# Patient Record
Sex: MALE | Race: WHITE | NOT HISPANIC OR LATINO | Employment: FULL TIME | URBAN - NONMETROPOLITAN AREA
[De-identification: names, ages, dates, MRNs, and addresses within clinical notes are randomized per-mention and may not be internally consistent; named-entity substitution may affect disease eponyms.]

---

## 2017-02-17 ENCOUNTER — APPOINTMENT (OUTPATIENT)
Dept: GENERAL RADIOLOGY | Facility: HOSPITAL | Age: 62
End: 2017-02-17

## 2017-02-17 ENCOUNTER — HOSPITAL ENCOUNTER (OUTPATIENT)
Facility: HOSPITAL | Age: 62
Setting detail: OBSERVATION
Discharge: HOME | End: 2017-02-18
Attending: FAMILY MEDICINE | Admitting: HOSPITALIST

## 2017-02-17 DIAGNOSIS — R07.9 CHEST PAIN, UNSPECIFIED TYPE: Primary | ICD-10-CM

## 2017-02-17 LAB
ALBUMIN SERPL-MCNC: 4.3 G/DL (ref 3.4–4.8)
ALBUMIN/GLOB SERPL: 1.3 G/DL (ref 1.1–1.8)
ALP SERPL-CCNC: 97 U/L (ref 38–126)
ALT SERPL W P-5'-P-CCNC: 37 U/L (ref 21–72)
AMYLASE SERPL-CCNC: 70 U/L (ref 50–130)
ANION GAP SERPL CALCULATED.3IONS-SCNC: 11 MMOL/L (ref 5–15)
ARTICHOKE IGE QN: 87 MG/DL (ref 1–129)
AST SERPL-CCNC: 28 U/L (ref 17–59)
BASOPHILS # BLD AUTO: 0.03 10*3/MM3 (ref 0–0.2)
BASOPHILS NFR BLD AUTO: 0.5 % (ref 0–2)
BILIRUB SERPL-MCNC: 0.9 MG/DL (ref 0.2–1.3)
BUN BLD-MCNC: 19 MG/DL (ref 7–21)
BUN/CREAT SERPL: 19.4 (ref 7–25)
CALCIUM SPEC-SCNC: 8.9 MG/DL (ref 8.4–10.2)
CHLORIDE SERPL-SCNC: 103 MMOL/L (ref 95–110)
CHOLEST SERPL-MCNC: 170 MG/DL (ref 0–199)
CO2 SERPL-SCNC: 25 MMOL/L (ref 22–31)
CREAT BLD-MCNC: 0.98 MG/DL (ref 0.7–1.3)
D-DIMER, QUANTITATIVE (MAD,POW, STR): <270 NG/ML (FEU) (ref 0–470)
DEPRECATED RDW RBC AUTO: 41.2 FL (ref 35.1–43.9)
EOSINOPHIL # BLD AUTO: 0.32 10*3/MM3 (ref 0–0.7)
EOSINOPHIL NFR BLD AUTO: 5.1 % (ref 0–7)
ERYTHROCYTE [DISTWIDTH] IN BLOOD BY AUTOMATED COUNT: 12.6 % (ref 11.5–14.5)
GFR SERPL CREATININE-BSD FRML MDRD: 77 ML/MIN/1.73 (ref 49–113)
GLOBULIN UR ELPH-MCNC: 3.3 GM/DL (ref 2.3–3.5)
GLUCOSE BLD-MCNC: 91 MG/DL (ref 60–100)
HCT VFR BLD AUTO: 42.1 % (ref 39–49)
HDLC SERPL-MCNC: 26 MG/DL (ref 60–200)
HGB BLD-MCNC: 14.8 G/DL (ref 13.7–17.3)
HOLD SPECIMEN: NORMAL
IMM GRANULOCYTES # BLD: 0.01 10*3/MM3 (ref 0–0.02)
IMM GRANULOCYTES NFR BLD: 0.2 % (ref 0–0.5)
LDLC/HDLC SERPL: 4.23 {RATIO} (ref 0–3.55)
LIPASE SERPL-CCNC: 95 U/L (ref 23–300)
LYMPHOCYTES # BLD AUTO: 1.45 10*3/MM3 (ref 0.6–4.2)
LYMPHOCYTES NFR BLD AUTO: 23 % (ref 10–50)
MCH RBC QN AUTO: 31.4 PG (ref 26.5–34)
MCHC RBC AUTO-ENTMCNC: 35.2 G/DL (ref 31.5–36.3)
MCV RBC AUTO: 89.2 FL (ref 80–98)
MONOCYTES # BLD AUTO: 0.54 10*3/MM3 (ref 0–0.9)
MONOCYTES NFR BLD AUTO: 8.6 % (ref 0–12)
NEUTROPHILS # BLD AUTO: 3.96 10*3/MM3 (ref 2–8.6)
NEUTROPHILS NFR BLD AUTO: 62.6 % (ref 37–80)
NRBC BLD MANUAL-RTO: 0 /100 WBC (ref 0–0)
NT-PROBNP SERPL-MCNC: 144 PG/ML (ref 0–900)
PLATELET # BLD AUTO: 223 10*3/MM3 (ref 150–450)
PMV BLD AUTO: 8.9 FL (ref 8–12)
POTASSIUM BLD-SCNC: 4.5 MMOL/L (ref 3.5–5.1)
PROT SERPL-MCNC: 7.6 G/DL (ref 6.3–8.6)
RBC # BLD AUTO: 4.72 10*6/MM3 (ref 4.37–5.74)
SODIUM BLD-SCNC: 139 MMOL/L (ref 137–145)
TRIGL SERPL-MCNC: 170 MG/DL (ref 20–199)
TROPONIN I SERPL-MCNC: <0.012 NG/ML
WBC NRBC COR # BLD: 6.31 10*3/MM3 (ref 3.2–9.8)
WHOLE BLOOD HOLD SPECIMEN: NORMAL
WHOLE BLOOD HOLD SPECIMEN: NORMAL

## 2017-02-17 PROCEDURE — 83880 ASSAY OF NATRIURETIC PEPTIDE: CPT | Performed by: FAMILY MEDICINE

## 2017-02-17 PROCEDURE — 84484 ASSAY OF TROPONIN QUANT: CPT | Performed by: HOSPITALIST

## 2017-02-17 PROCEDURE — 93005 ELECTROCARDIOGRAM TRACING: CPT

## 2017-02-17 PROCEDURE — 83690 ASSAY OF LIPASE: CPT | Performed by: FAMILY MEDICINE

## 2017-02-17 PROCEDURE — 93010 ELECTROCARDIOGRAM REPORT: CPT | Performed by: INTERNAL MEDICINE

## 2017-02-17 PROCEDURE — 80053 COMPREHEN METABOLIC PANEL: CPT | Performed by: FAMILY MEDICINE

## 2017-02-17 PROCEDURE — G0378 HOSPITAL OBSERVATION PER HR: HCPCS

## 2017-02-17 PROCEDURE — 85025 COMPLETE CBC W/AUTO DIFF WBC: CPT | Performed by: FAMILY MEDICINE

## 2017-02-17 PROCEDURE — 80061 LIPID PANEL: CPT | Performed by: HOSPITALIST

## 2017-02-17 PROCEDURE — 85379 FIBRIN DEGRADATION QUANT: CPT | Performed by: FAMILY MEDICINE

## 2017-02-17 PROCEDURE — 82150 ASSAY OF AMYLASE: CPT | Performed by: FAMILY MEDICINE

## 2017-02-17 PROCEDURE — 84484 ASSAY OF TROPONIN QUANT: CPT | Performed by: FAMILY MEDICINE

## 2017-02-17 PROCEDURE — 71020 HC CHEST PA AND LATERAL: CPT

## 2017-02-17 PROCEDURE — 99285 EMERGENCY DEPT VISIT HI MDM: CPT

## 2017-02-17 RX ORDER — NALOXONE HCL 0.4 MG/ML
0.4 VIAL (ML) INJECTION
Status: DISCONTINUED | OUTPATIENT
Start: 2017-02-17 | End: 2017-02-18 | Stop reason: HOSPADM

## 2017-02-17 RX ORDER — ONDANSETRON 4 MG/1
4 TABLET, ORALLY DISINTEGRATING ORAL EVERY 6 HOURS PRN
Status: DISCONTINUED | OUTPATIENT
Start: 2017-02-17 | End: 2017-02-18 | Stop reason: HOSPADM

## 2017-02-17 RX ORDER — SODIUM CHLORIDE 0.9 % (FLUSH) 0.9 %
1-10 SYRINGE (ML) INJECTION AS NEEDED
Status: DISCONTINUED | OUTPATIENT
Start: 2017-02-17 | End: 2017-02-18 | Stop reason: HOSPADM

## 2017-02-17 RX ORDER — MORPHINE SULFATE 2 MG/ML
1 INJECTION, SOLUTION INTRAMUSCULAR; INTRAVENOUS EVERY 4 HOURS PRN
Status: DISCONTINUED | OUTPATIENT
Start: 2017-02-17 | End: 2017-02-18 | Stop reason: HOSPADM

## 2017-02-17 RX ORDER — ASPIRIN 81 MG/1
324 TABLET, CHEWABLE ORAL ONCE
Status: DISCONTINUED | OUTPATIENT
Start: 2017-02-17 | End: 2017-02-17

## 2017-02-17 RX ORDER — SODIUM CHLORIDE 9 MG/ML
75 INJECTION, SOLUTION INTRAVENOUS CONTINUOUS
Status: DISCONTINUED | OUTPATIENT
Start: 2017-02-17 | End: 2017-02-18 | Stop reason: HOSPADM

## 2017-02-17 RX ORDER — NITROGLYCERIN 0.4 MG/1
0.4 TABLET SUBLINGUAL
Status: DISCONTINUED | OUTPATIENT
Start: 2017-02-17 | End: 2017-02-18 | Stop reason: HOSPADM

## 2017-02-17 RX ORDER — ONDANSETRON 2 MG/ML
4 INJECTION INTRAMUSCULAR; INTRAVENOUS EVERY 6 HOURS PRN
Status: DISCONTINUED | OUTPATIENT
Start: 2017-02-17 | End: 2017-02-18 | Stop reason: HOSPADM

## 2017-02-17 RX ORDER — ONDANSETRON 4 MG/1
4 TABLET, FILM COATED ORAL EVERY 6 HOURS PRN
Status: DISCONTINUED | OUTPATIENT
Start: 2017-02-17 | End: 2017-02-18 | Stop reason: HOSPADM

## 2017-02-17 RX ORDER — SODIUM CHLORIDE 0.9 % (FLUSH) 0.9 %
10 SYRINGE (ML) INJECTION AS NEEDED
Status: DISCONTINUED | OUTPATIENT
Start: 2017-02-17 | End: 2017-02-18 | Stop reason: HOSPADM

## 2017-02-17 RX ADMIN — Medication 5 ML: at 22:51

## 2017-02-17 RX ADMIN — Medication 10 ML: at 20:49

## 2017-02-17 RX ADMIN — NITROGLYCERIN 0.4 MG: 0.4 TABLET SUBLINGUAL at 18:20

## 2017-02-17 RX ADMIN — SODIUM CHLORIDE 75 ML/HR: 9 INJECTION, SOLUTION INTRAVENOUS at 22:51

## 2017-02-17 NOTE — ED PROVIDER NOTES
Subjective   HPI Comments: C/o mild SOB, discomfort in chest, dizziness with movement, pain in right ear onset about 3 days ago.    Patient is a 62 y.o. male presenting with chest pain.   History provided by:  Patient  Chest Pain   Chest pain location: mid sternum.  Pain quality: aching and pressure    Pain radiates to:  R jaw and L jaw  Pain severity:  Mild (rates it 2 - 3)  Onset quality:  Gradual  Duration:  3 days  Timing:  Intermittent      Review of Systems   Constitutional: Negative.    HENT: Positive for ear pain.    Eyes: Negative.    Respiratory: Negative.    Cardiovascular: Positive for chest pain.   Gastrointestinal: Negative.    Genitourinary: Negative.    Musculoskeletal: Negative.    Skin: Negative.    Neurological: Negative.    Psychiatric/Behavioral: Negative.        History reviewed. No pertinent past medical history.    No Known Allergies    History reviewed. No pertinent past surgical history.    History reviewed. No pertinent family history.    Social History     Social History   • Marital status:      Spouse name: N/A   • Number of children: N/A   • Years of education: N/A     Social History Main Topics   • Smoking status: Never Smoker   • Smokeless tobacco: None   • Alcohol use No   • Drug use: No   • Sexual activity: Not Asked     Other Topics Concern   • None     Social History Narrative   • None           Objective   Physical Exam   Constitutional: He is oriented to person, place, and time. He appears well-developed and well-nourished.   HENT:   Head: Normocephalic.   Right canal occluded with cerumen, Left TM clear   Eyes: EOM are normal. Pupils are equal, round, and reactive to light.   Neck: Normal range of motion. Neck supple.   Cardiovascular: Normal rate.    Pulmonary/Chest: Effort normal.   Abdominal: Soft. Bowel sounds are normal.   Neurological: He is alert and oriented to person, place, and time.   Skin: Skin is warm and dry.   Nursing note and vitals reviewed.    Visit  "Vitals   • /71 (BP Location: Right arm, Patient Position: Lying)   • Pulse 74   • Temp 97.9 °F (36.6 °C) (Oral)   • Resp 18   • Ht 68\" (172.7 cm)   • Wt 220 lb (99.8 kg)   • SpO2 97%   • BMI 33.45 kg/m2         ECG 12 Lead    Date/Time: 2/17/2017 4:16 PM  Performed by: ROSEMARY QUEEN  Authorized by: MERCEDES HENDERSON   Interpreted by physician  Comparison: compared with previous ECG from 10/26/2002  Similar to previous ECG  Rhythm: sinus rhythm  BPM: 78  Clinical impression: normal ECG               ED Course  ED Course      XR Chest 2 View   Final Result   CONCLUSION: No acute cardiopulmonary pathology is identified.      Electronically signed by:  Celio Handley MD  2/17/2017 4:55   PM CST Workstation: [employer]        Results for orders placed or performed during the hospital encounter of 02/17/17   Troponin   Result Value Ref Range    Troponin I <0.012 <=0.034 ng/mL   Troponin   Result Value Ref Range    Troponin I <0.012 <=0.034 ng/mL   Comprehensive Metabolic Panel   Result Value Ref Range    Glucose 91 60 - 100 mg/dL    BUN 19 7 - 21 mg/dL    Creatinine 0.98 0.70 - 1.30 mg/dL    Sodium 139 137 - 145 mmol/L    Potassium 4.5 3.5 - 5.1 mmol/L    Chloride 103 95 - 110 mmol/L    CO2 25.0 22.0 - 31.0 mmol/L    Calcium 8.9 8.4 - 10.2 mg/dL    Total Protein 7.6 6.3 - 8.6 g/dL    Albumin 4.30 3.40 - 4.80 g/dL    ALT (SGPT) 37 21 - 72 U/L    AST (SGOT) 28 17 - 59 U/L    Alkaline Phosphatase 97 38 - 126 U/L    Total Bilirubin 0.9 0.2 - 1.3 mg/dL    eGFR Non  Amer 77 49 - 113 mL/min/1.73    Globulin 3.3 2.3 - 3.5 gm/dL    A/G Ratio 1.3 1.1 - 1.8 g/dL    BUN/Creatinine Ratio 19.4 7.0 - 25.0    Anion Gap 11.0 5.0 - 15.0 mmol/L   BNP   Result Value Ref Range    proBNP 144.0 0.0 - 900.0 pg/mL   CBC Auto Differential   Result Value Ref Range    WBC 6.31 3.20 - 9.80 10*3/mm3    RBC 4.72 4.37 - 5.74 10*6/mm3    Hemoglobin 14.8 13.7 - 17.3 g/dL    Hematocrit 42.1 39.0 - 49.0 %    MCV 89.2 80.0 - 98.0 fL    " MCH 31.4 26.5 - 34.0 pg    MCHC 35.2 31.5 - 36.3 g/dL    RDW 12.6 11.5 - 14.5 %    RDW-SD 41.2 35.1 - 43.9 fl    MPV 8.9 8.0 - 12.0 fL    Platelets 223 150 - 450 10*3/mm3    Neutrophil % 62.6 37.0 - 80.0 %    Lymphocyte % 23.0 10.0 - 50.0 %    Monocyte % 8.6 0.0 - 12.0 %    Eosinophil % 5.1 0.0 - 7.0 %    Basophil % 0.5 0.0 - 2.0 %    Immature Grans % 0.2 0.0 - 0.5 %    Neutrophils, Absolute 3.96 2.00 - 8.60 10*3/mm3    Lymphocytes, Absolute 1.45 0.60 - 4.20 10*3/mm3    Monocytes, Absolute 0.54 0.00 - 0.90 10*3/mm3    Eosinophils, Absolute 0.32 0.00 - 0.70 10*3/mm3    Basophils, Absolute 0.03 0.00 - 0.20 10*3/mm3    Immature Grans, Absolute 0.01 0.00 - 0.02 10*3/mm3    nRBC 0.0 0.0 - 0.0 /100 WBC   D-dimer, Quantitative   Result Value Ref Range    D-Dimer, Quantitative <270 0 - 470 ng/mL (FEU)   Amylase   Result Value Ref Range    Amylase 70 50 - 130 U/L   Lipase   Result Value Ref Range    Lipase 95 23 - 300 U/L   Light Blue Top   Result Value Ref Range    Extra Tube hold for add-on    Green Top (Gel)   Result Value Ref Range    Extra Tube Hold for add-ons.    Lavender Top   Result Value Ref Range    Extra Tube hold for add-on              HEART Score  History: Moderately suspicious (+1)  ECG: Normal (+0)  Age: 45 through 65 (+1)  Risk Factors: 1 - 2 risk factors (+1)  Troponin: Normal limit or lower (+0)  Total: 3         MDM  Number of Diagnoses or Management Options  Chest pain, unspecified type:   Diagnosis management comments: D/W Dr Browning pt having continued chest pain, all labs WNL. No cardiac hx. Will admit to OBS.      Final diagnoses:   Chest pain, unspecified type            Sandy Torre, APRN  02/17/17 7995

## 2017-02-18 ENCOUNTER — APPOINTMENT (OUTPATIENT)
Dept: CARDIOLOGY | Facility: HOSPITAL | Age: 62
End: 2017-02-18
Attending: HOSPITALIST

## 2017-02-18 VITALS
RESPIRATION RATE: 18 BRPM | HEIGHT: 68 IN | OXYGEN SATURATION: 97 % | SYSTOLIC BLOOD PRESSURE: 154 MMHG | DIASTOLIC BLOOD PRESSURE: 78 MMHG | WEIGHT: 226.5 LBS | TEMPERATURE: 98 F | HEART RATE: 91 BPM | BODY MASS INDEX: 34.33 KG/M2

## 2017-02-18 PROBLEM — R07.9 CHEST PAIN: Status: RESOLVED | Noted: 2017-02-17 | Resolved: 2017-02-18

## 2017-02-18 PROBLEM — R07.2 PRECORDIAL PAIN: Status: ACTIVE | Noted: 2017-02-17

## 2017-02-18 PROBLEM — R42 DIZZINESS: Status: ACTIVE | Noted: 2017-02-18

## 2017-02-18 LAB
ANION GAP SERPL CALCULATED.3IONS-SCNC: 6 MMOL/L (ref 5–15)
BH CV ECHO MEAS - ACS: 2 CM
BH CV ECHO MEAS - AO ISTHMUS: 2.5 CM
BH CV ECHO MEAS - AO MAX PG (FULL): 7.7 MMHG
BH CV ECHO MEAS - AO MAX PG: 13 MMHG
BH CV ECHO MEAS - AO MEAN PG (FULL): 5.7 MMHG
BH CV ECHO MEAS - AO MEAN PG: 8.6 MMHG
BH CV ECHO MEAS - AO ROOT AREA: 7.5 CM^2
BH CV ECHO MEAS - AO ROOT DIAM: 3.1 CM
BH CV ECHO MEAS - AO V2 MAX: 180 CM/SEC
BH CV ECHO MEAS - AO V2 MEAN: 144.2 CM/SEC
BH CV ECHO MEAS - AO V2 VTI: 42.6 CM
BH CV ECHO MEAS - ASC AORTA: 2.7 CM
BH CV ECHO MEAS - AVA(I,A): 1.8 CM^2
BH CV ECHO MEAS - AVA(I,D): 1.8 CM^2
BH CV ECHO MEAS - AVA(V,A): 2.1 CM^2
BH CV ECHO MEAS - AVA(V,D): 2.1 CM^2
BH CV ECHO MEAS - EDV(CUBED): 109.8 ML
BH CV ECHO MEAS - EDV(TEICH): 106.9 ML
BH CV ECHO MEAS - EF(CUBED): 82.8 %
BH CV ECHO MEAS - EF(TEICH): 75.6 %
BH CV ECHO MEAS - ESV(CUBED): 18.8 ML
BH CV ECHO MEAS - ESV(TEICH): 26.1 ML
BH CV ECHO MEAS - FS: 44.4 %
BH CV ECHO MEAS - IVS/LVPW: 0.98
BH CV ECHO MEAS - IVSD: 1.1 CM
BH CV ECHO MEAS - LA DIMENSION: 4 CM
BH CV ECHO MEAS - LA/AO: 1.3
BH CV ECHO MEAS - LV MASS(C)D: 187 GRAMS
BH CV ECHO MEAS - LV MAX PG: 5.2 MMHG
BH CV ECHO MEAS - LV MEAN PG: 2.9 MMHG
BH CV ECHO MEAS - LV V1 MAX: 114.5 CM/SEC
BH CV ECHO MEAS - LV V1 MEAN: 79.1 CM/SEC
BH CV ECHO MEAS - LV V1 VTI: 22.8 CM
BH CV ECHO MEAS - LVIDD: 4.8 CM
BH CV ECHO MEAS - LVIDS: 2.7 CM
BH CV ECHO MEAS - LVOT AREA (M): 3.5 CM^2
BH CV ECHO MEAS - LVOT AREA: 3.4 CM^2
BH CV ECHO MEAS - LVOT DIAM: 2.1 CM
BH CV ECHO MEAS - LVPWD: 1.1 CM
BH CV ECHO MEAS - MV A MAX VEL: 53 CM/SEC
BH CV ECHO MEAS - MV DEC SLOPE: 450.4 CM/SEC^2
BH CV ECHO MEAS - MV E MAX VEL: 65.6 CM/SEC
BH CV ECHO MEAS - MV E/A: 1.2
BH CV ECHO MEAS - MV MAX PG: 3 MMHG
BH CV ECHO MEAS - MV MEAN PG: 1.4 MMHG
BH CV ECHO MEAS - MV P1/2T MAX VEL: 67.8 CM/SEC
BH CV ECHO MEAS - MV P1/2T: 44.1 MSEC
BH CV ECHO MEAS - MV V2 MAX: 86.4 CM/SEC
BH CV ECHO MEAS - MV V2 MEAN: 53.6 CM/SEC
BH CV ECHO MEAS - MV V2 VTI: 21.3 CM
BH CV ECHO MEAS - MVA P1/2T LCG: 3.2 CM^2
BH CV ECHO MEAS - MVA(P1/2T): 5 CM^2
BH CV ECHO MEAS - MVA(VTI): 3.6 CM^2
BH CV ECHO MEAS - PA MAX PG: 7.2 MMHG
BH CV ECHO MEAS - PA V2 MAX: 134.4 CM/SEC
BH CV ECHO MEAS - RAP SYSTOLE: 5 MMHG
BH CV ECHO MEAS - RVSP: 24.3 MMHG
BH CV ECHO MEAS - SV(AO): 319.4 ML
BH CV ECHO MEAS - SV(CUBED): 91 ML
BH CV ECHO MEAS - SV(LVOT): 76.9 ML
BH CV ECHO MEAS - SV(TEICH): 80.9 ML
BH CV ECHO MEAS - TR MAX VEL: 212 CM/SEC
BUN BLD-MCNC: 16 MG/DL (ref 7–21)
BUN/CREAT SERPL: 16.7 (ref 7–25)
CALCIUM SPEC-SCNC: 8.8 MG/DL (ref 8.4–10.2)
CHLORIDE SERPL-SCNC: 106 MMOL/L (ref 95–110)
CO2 SERPL-SCNC: 29 MMOL/L (ref 22–31)
CREAT BLD-MCNC: 0.96 MG/DL (ref 0.7–1.3)
DEPRECATED RDW RBC AUTO: 41.4 FL (ref 35.1–43.9)
ERYTHROCYTE [DISTWIDTH] IN BLOOD BY AUTOMATED COUNT: 12.5 % (ref 11.5–14.5)
GFR SERPL CREATININE-BSD FRML MDRD: 79 ML/MIN/1.73 (ref 60–113)
GLUCOSE BLD-MCNC: 99 MG/DL (ref 60–100)
HCT VFR BLD AUTO: 38.4 % (ref 39–49)
HGB BLD-MCNC: 13.5 G/DL (ref 13.7–17.3)
MCH RBC QN AUTO: 32 PG (ref 26.5–34)
MCHC RBC AUTO-ENTMCNC: 35.2 G/DL (ref 31.5–36.3)
MCV RBC AUTO: 91 FL (ref 80–98)
PLATELET # BLD AUTO: 190 10*3/MM3 (ref 150–450)
PMV BLD AUTO: 10 FL (ref 8–12)
POTASSIUM BLD-SCNC: 3.9 MMOL/L (ref 3.5–5.1)
RBC # BLD AUTO: 4.22 10*6/MM3 (ref 4.37–5.74)
SODIUM BLD-SCNC: 141 MMOL/L (ref 137–145)
TROPONIN I SERPL-MCNC: <0.012 NG/ML
TROPONIN I SERPL-MCNC: <0.012 NG/ML
WBC NRBC COR # BLD: 5.29 10*3/MM3 (ref 3.2–9.8)

## 2017-02-18 PROCEDURE — 84484 ASSAY OF TROPONIN QUANT: CPT | Performed by: HOSPITALIST

## 2017-02-18 PROCEDURE — 93306 TTE W/DOPPLER COMPLETE: CPT | Performed by: INTERNAL MEDICINE

## 2017-02-18 PROCEDURE — 85027 COMPLETE CBC AUTOMATED: CPT | Performed by: HOSPITALIST

## 2017-02-18 PROCEDURE — G0378 HOSPITAL OBSERVATION PER HR: HCPCS

## 2017-02-18 PROCEDURE — 93306 TTE W/DOPPLER COMPLETE: CPT

## 2017-02-18 PROCEDURE — 80048 BASIC METABOLIC PNL TOTAL CA: CPT | Performed by: HOSPITALIST

## 2017-02-18 RX ORDER — MECLIZINE HYDROCHLORIDE 25 MG/1
25 TABLET ORAL 3 TIMES DAILY PRN
Qty: 20 TABLET | Refills: 0 | Status: SHIPPED | OUTPATIENT
Start: 2017-02-18 | End: 2021-10-29

## 2017-02-18 RX ADMIN — SODIUM CHLORIDE 75 ML/HR: 9 INJECTION, SOLUTION INTRAVENOUS at 10:17

## 2017-02-18 NOTE — PLAN OF CARE
Problem: Patient Care Overview (Adult)  Goal: Plan of Care Review  Outcome: Outcome(s) achieved Date Met:  02/18/17  Goal: Adult Individualization and Mutuality  Outcome: Outcome(s) achieved Date Met:  02/18/17  Goal: Discharge Needs Assessment  Outcome: Outcome(s) achieved Date Met:  02/18/17    Problem: Acute Coronary Syndrome (ACS) (Adult)  Goal: Signs and Symptoms of Listed Potential Problems Will be Absent or Manageable (Acute Coronary Syndrome)  Outcome: Outcome(s) achieved Date Met:  02/18/17

## 2017-02-18 NOTE — DISCHARGE SUMMARY
Date of Admission: 2/17/2017  Date of Discharge:  2/18/2017    Discharge Diagnosis:  Precordial chest pain, acute MI or coronary syndrome ruled out.  Low HDL    obesity    Presenting Problem/History of Present Illness    Precordial chest pain    Hospital Course  62-year-old male with no known prior medical history of any significant medical problems presented with precordial chest pain.  Initial EKG did not reveal any acute changes.  Subsequent EKG and telemetry also without any changes.  Cardiac enzymes also came back negative.  Since admission his chest pain has been resolved.  But today he did have some headache and dizziness.  He did have ear irrigation done last night in the emergency room.  Also on labs his HDL was low but otherwise rest of the other blood workup was negative.  As overall he is doing much better and ambulating well he is being discharged in improved and stable condition to be followed up as an outpatient.  Also discussed that he needs to stop his primary care and also needs to have a stress test done with cardiologist Dr. Arora.    Procedures Performed:  None    Consults:  None  Consults     Date and Time Order Name Status Description    2/17/2017 2036 Hospitalist (on-call MD unless specified)            Pertinent Test Results:  Chest x-ray: Unremarkable    · Echocardiogram:Left ventricular function is normal  · Estimated EF appears to be in the range of 61 - 65%.  · The right ventricle is normal in size and shape with normal systolic function.  · No evidence of pulmonary hypertension  No significant valvular abnormality      Lab Results (last 24 hours)     Procedure Component Value Units Date/Time    CBC & Differential [[id number]] Collected:  02/17/17 1633    Specimen:  Blood Updated:  02/17/17 1643    Narrative:       The following orders were created for panel order CBC & Differential.  Procedure                               Abnormality         Status                     ---------                                -----------         ------                     CBC Auto Differential[[id number]]         Normal              Final result                 Please view results for these tests on the individual orders.    CBC Auto Differential [[id number]]  (Normal) Collected:  02/17/17 1633    Specimen:  Blood Updated:  02/17/17 1643     WBC 6.31 10*3/mm3      RBC 4.72 10*6/mm3      Hemoglobin 14.8 g/dL      Hematocrit 42.1 %      MCV 89.2 fL      MCH 31.4 pg      MCHC 35.2 g/dL      RDW 12.6 %      RDW-SD 41.2 fl      MPV 8.9 fL      Platelets 223 10*3/mm3      Neutrophil % 62.6 %      Lymphocyte % 23.0 %      Monocyte % 8.6 %      Eosinophil % 5.1 %      Basophil % 0.5 %      Immature Grans % 0.2 %      Neutrophils, Absolute 3.96 10*3/mm3      Lymphocytes, Absolute 1.45 10*3/mm3      Monocytes, Absolute 0.54 10*3/mm3      Eosinophils, Absolute 0.32 10*3/mm3      Basophils, Absolute 0.03 10*3/mm3      Immature Grans, Absolute 0.01 10*3/mm3      nRBC 0.0 /100 WBC     Comprehensive Metabolic Panel [[id number]]  (Normal) Collected:  02/17/17 1633    Specimen:  Blood Updated:  02/17/17 1652     Glucose 91 mg/dL      BUN 19 mg/dL      Creatinine 0.98 mg/dL      Sodium 139 mmol/L      Potassium 4.5 mmol/L      Chloride 103 mmol/L      CO2 25.0 mmol/L      Calcium 8.9 mg/dL      Total Protein 7.6 g/dL      Albumin 4.30 g/dL      ALT (SGPT) 37 U/L      AST (SGOT) 28 U/L      Alkaline Phosphatase 97 U/L      Total Bilirubin 0.9 mg/dL      eGFR Non African Amer 77 mL/min/1.73      Globulin 3.3 gm/dL      A/G Ratio 1.3 g/dL      BUN/Creatinine Ratio 19.4      Anion Gap 11.0 mmol/L     Troponin [[id number]]  (Normal) Collected:  02/17/17 1633    Specimen:  Blood Updated:  02/17/17 1704     Troponin I <0.012 ng/mL     BNP [[id number]]  (Normal) Collected:  02/17/17 1633    Specimen:  Blood Updated:  02/17/17 1704     proBNP 144.0 pg/mL     D-dimer, Quantitative [[id number]]  (Normal) Collected:  02/17/17 1633    Specimen:  Blood  Updated:  02/17/17 1802     D-Dimer, Quantitative <270 ng/mL (FEU)     Narrative:       Dimer values <500 ng/ml FEU are FDA approved as aid in diagnosis of deep venous thrombosis and pulmonary embolism.  This test should not be used in an exclusion strategy with pretest probability alone.    A recent guideline regarding diagnosis for pulmonary thomboembolism recommends an adjusted exclusion criterion of age x 10 ng/ml FEU for patients >50 years of age ([employer] Intern Med 2015; 163: 701-711).    Amylase [[id number]]  (Normal) Collected:  02/17/17 1633    Specimen:  Blood Updated:  02/17/17 1803     Amylase 70 U/L     Lipase [[id number]]  (Normal) Collected:  02/17/17 1633    Specimen:  Blood Updated:  02/17/17 1803     Lipase 95 U/L     Troponin [[id number]]  (Normal) Collected:  02/17/17 1903    Specimen:  Blood from Arm, Left Updated:  02/17/17 1940     Troponin I <0.012 ng/mL     Green Top (Gel) [[id number]] Collected:  02/17/17 1633    Specimen:  Blood Updated:  02/17/17 2101     Extra Tube Hold for add-ons.       Auto resulted.       Lavender Top [[id number]] Collected:  02/17/17 1633    Specimen:  Blood Updated:  02/17/17 2101     Extra Tube hold for add-on       Auto resulted       [facility] Draw [[id number]] Collected:  02/17/17 1633    Specimen:  Blood Updated:  02/17/17 2101    Narrative:       The following orders were created for panel order [facility] Draw.  Procedure                               Abnormality         Status                     ---------                               -----------         ------                     Light Blue Top[[id number]]                                    Final result               Green Top (Gel)[[id number]]                                   Final result               Lavender Top[[id number]]                                      Final result               Gold Top - SST[[id number]]                                                                 Please view results for these tests on the individual  orders.    Light Blue Top [[id number]] Collected:  17 1633    Specimen:  Blood Updated:  17 2101     Extra Tube hold for add-on       Auto resulted       Troponin [[id number]]  (Normal) Collected:  17    Specimen:  Blood Updated:  17     Troponin I <0.012 ng/mL     Lipid Panel [[id number]]  (Abnormal) Collected:  17    Specimen:  Blood Updated:  17     Total Cholesterol 170 mg/dL      Triglycerides 170 mg/dL      HDL Cholesterol 26 (L) mg/dL      LDL Cholesterol  87 mg/dL      LDL/HDL Ratio 4.23 (H)     CBC (No Diff) [[id number]]  (Abnormal) Collected:  17    Specimen:  Blood Updated:  17 0610     WBC 5.29 10*3/mm3      RBC 4.22 (L) 10*6/mm3      Hemoglobin 13.5 (L) g/dL      Hematocrit 38.4 (L) %      MCV 91.0 fL      MCH 32.0 pg      MCHC 35.2 g/dL      RDW 12.5 %      RDW-SD 41.4 fl      MPV 10.0 fL      Platelets 190 10*3/mm3     Basic Metabolic Panel [[id number]]  (Normal) Collected:  17    Specimen:  Blood Updated:  17 0619     Glucose 99 mg/dL      BUN 16 mg/dL      Creatinine 0.96 mg/dL      Sodium 141 mmol/L      Potassium 3.9 mmol/L      Chloride 106 mmol/L      CO2 29.0 mmol/L      Calcium 8.8 mg/dL      eGFR Non African Amer 79 mL/min/1.73      BUN/Creatinine Ratio 16.7      Anion Gap 6.0 mmol/L     Troponin [[id number]]  (Normal) Collected:  17 05    Specimen:  Blood Updated:  17 0630     Troponin I <0.012 ng/mL     Troponin [[id number]]  (Normal) Collected:  17 1235    Specimen:  Blood Updated:  17 1326     Troponin I <0.012 ng/mL             Condition on Discharge:   Chest pain improved.  Overall condition improved and stable.  Vital Signs past 24hrs  BP range: BP: (100-171)/(57-98) 130/68  Pulse range: Heart Rate:  [66-78] 70  Resp rate range: Resp:  [18] 18  Temp range: Temp (24hrs), Av.3 °F (36.3 °C), Min:96.6 °F (35.9 °C), Max:97.9 °F (36.6 °C)    O2 Device: room airFlow (L/min):  [2]  2  Oxygen range:SpO2:  [96 %-100 %] 97 %   226 lb 8 oz (103 kg); Body mass index is 34.44 kg/(m^2).    Intake/Output Summary (Last 24 hours) at 02/18/17 1606  Last data filed at 02/18/17 1322   Gross per 24 hour   Intake 2052.25 ml   Output    525 ml   Net 1527.25 ml       Physical Exam  HEENT: Unremarkable  Neck: Supple, no JVD  Lungs: Clear, good breath sounds bilaterally, no wheezing or rhonchi  Heart: Regular rate rhythm, no S3 or gallop  Abdomen: Soft, nontender  Extremities: No edema  Neurologic: Nonfocal exam    Discharge Disposition  Home or Self Care    Discharge Medications     Your medication list      START taking these medications       Instructions Last Dose Given Next Dose Due    aspirin 81 MG tablet        Take 1 tablet by mouth Daily.         meclizine 25 MG tablet   Commonly known as:  ANTIVERT        Take 1 tablet by mouth 3 (Three) Times a Day As Needed for dizziness.              Where to Get Your Medications      These medications were sent to [facility] [address] [place], [place] [address] [facility] - [phone] [facility] [phone]   [address] [facility] [address]     Phone:  [phone]    • aspirin 81 MG tablet   • meclizine 25 MG tablet             Discharge Diet:  Diet Order   Procedures   • Diet Regular; Cardiac, Consistent Carbohydrate       Activity at Discharge:    As tolerated    Follow-up Appointments  Follow-up Information     Follow up with No Known Provider Follow up in 1 week(s).    Why:  Set to follow up with [facility] primary care provider    Contact information:    [facility] [id number]          Follow up with Partha Arora MD PhD Follow up in 1 week(s).    Specialties:  Cardiology, Interventional Radiology    Why:  Probably need to set up stress test to rule out ischemic heart disease    Contact information:    [address] [place] [address] [address]  [facility] [address] [phone]              Discharge  Instructions  Come back to emergency room if has recurrent chest pain    Test Results Pending at Discharge   Order Current Status    Gold Top - SST Collected (02/17/17 1633)    [place] Draw In process       discuss in detail with patient and family members about importance of follow-up including a stop missing primary care physician and also stress test to rule out any ischemic heart disease.  Risk factors modifications also explained.     Kerri Castellanos MD  02/18/17  4:06 PM    Time: I spent 30mins in the discharge planning of this patient.    EMR Dragon/Transcription disclaimer:   Much of this encounter note is an electronic transcription/translation of spoken language to printed text. The electronic translation of spoken language may permit erroneous, or at times, nonsensical words or phrases to be inadvertently transcribed; Although I have reviewed the note for such errors, some may still exist.

## 2017-02-18 NOTE — ED NOTES
Brandie contacted Rachael in Telemetry to request a telemetry box     Corinne Jones  02/17/17 9822

## 2017-02-18 NOTE — H&P
[facility] Medicine Admission      Date of Admission: 2/17/2017      Primary Care Physician: No Known Provider    Following information is obtained partially from patient, family and/or medical records.    Chief Complaint:   Chief Complaint   Patient presents with   • Chest Pain   • Shortness of Breath   • Dizziness       HPI: Pt is a 62 y.o. male who presents for evaluation of chest pain Onset was 2 weeks ago, with worsening course since that time.  The patient admits to chest discomfort that is intermittent, located in the substernal area. with radiation to right neck/jaw and right shoulder, rated as a scale of 2/10 in intensity that is exertional in nature.  Associated symptoms are chest pain, dyspnea and near-syncope. Aggravating factors are exercise and walking.  Alleviating factors are: rest. Patient's cardiac risk factors are hypertension and male gender.    Concurrent Medical History:   Patient Active Problem List   Diagnosis   • Chest pain       Past Surgical History: History reviewed. No pertinent past surgical history.    Family History: family history is not on file.    Social History:  reports that he has never smoked. He does not have any smokeless tobacco history on file. He reports that he does not drink alcohol or use illicit drugs.    Allergies: No Known Allergies    Home Medications:   Prior to Admission medications    Not on File       Review of Systems:  Review of Systems   Constitutional: Negative.    HENT: Negative.    Eyes: Negative.    Respiratory: Positive for shortness of breath.    Cardiovascular: Positive for chest pain.   Gastrointestinal: Negative.    Endocrine: Negative.    Genitourinary: Negative.    Skin: Negative.    Neurological: Positive for syncope.      Otherwise complete ROS is negative except as mentioned above and in HPI.    Physical Exam:   Temp:  [97.9 °F (36.6 °C)] 97.9 °F (36.6 °C)  Heart Rate:  [68-78] 74  Resp:  [18] 18  BP:  (121-171)/(68-98) 127/71  Physical Exam   Constitutional: He is oriented to person, place, and time. He appears well-developed and well-nourished.   HENT:   Head: Normocephalic and atraumatic.   Nose: Nose normal.   Mouth/Throat: Oropharynx is clear and moist.   Eyes: Conjunctivae are normal. Pupils are equal, round, and reactive to light. No scleral icterus.   Neck: No tracheal deviation present.   Cardiovascular: Normal heart sounds.  Exam reveals no friction rub.    Pulmonary/Chest: Effort normal and breath sounds normal. No respiratory distress. He has no wheezes. He has no rales.   Abdominal: Soft. Bowel sounds are normal. He exhibits no distension. There is no tenderness.   Musculoskeletal: Normal range of motion.   Neurological: He is alert and oriented to person, place, and time.   Skin: Skin is warm and dry.         Results Reviewed:  I have personally reviewed current lab, radiology, and data and agree with results.  Lab Results (last 24 hours)     Procedure Component Value Units Date/Time    CBC & Differential [[id number]] Collected:  02/17/17 1633    Specimen:  Blood Updated:  02/17/17 1643    Narrative:       The following orders were created for panel order CBC & Differential.  Procedure                               Abnormality         Status                     ---------                               -----------         ------                     CBC Auto Differential[[id number]]         Normal              Final result                 Please view results for these tests on the individual orders.    CBC Auto Differential [[id number]]  (Normal) Collected:  02/17/17 1633    Specimen:  Blood Updated:  02/17/17 1643     WBC 6.31 10*3/mm3      RBC 4.72 10*6/mm3      Hemoglobin 14.8 g/dL      Hematocrit 42.1 %      MCV 89.2 fL      MCH 31.4 pg      MCHC 35.2 g/dL      RDW 12.6 %      RDW-SD 41.2 fl      MPV 8.9 fL      Platelets 223 10*3/mm3      Neutrophil % 62.6 %      Lymphocyte % 23.0 %      Monocyte % 8.6 %       Eosinophil % 5.1 %      Basophil % 0.5 %      Immature Grans % 0.2 %      Neutrophils, Absolute 3.96 10*3/mm3      Lymphocytes, Absolute 1.45 10*3/mm3      Monocytes, Absolute 0.54 10*3/mm3      Eosinophils, Absolute 0.32 10*3/mm3      Basophils, Absolute 0.03 10*3/mm3      Immature Grans, Absolute 0.01 10*3/mm3      nRBC 0.0 /100 WBC     Comprehensive Metabolic Panel [[id number]]  (Normal) Collected:  02/17/17 1633    Specimen:  Blood Updated:  02/17/17 1652     Glucose 91 mg/dL      BUN 19 mg/dL      Creatinine 0.98 mg/dL      Sodium 139 mmol/L      Potassium 4.5 mmol/L      Chloride 103 mmol/L      CO2 25.0 mmol/L      Calcium 8.9 mg/dL      Total Protein 7.6 g/dL      Albumin 4.30 g/dL      ALT (SGPT) 37 U/L      AST (SGOT) 28 U/L      Alkaline Phosphatase 97 U/L      Total Bilirubin 0.9 mg/dL      eGFR Non African Amer 77 mL/min/1.73      Globulin 3.3 gm/dL      A/G Ratio 1.3 g/dL      BUN/Creatinine Ratio 19.4      Anion Gap 11.0 mmol/L     Troponin [[id number]]  (Normal) Collected:  02/17/17 1633    Specimen:  Blood Updated:  02/17/17 1704     Troponin I <0.012 ng/mL     BNP [[id number]]  (Normal) Collected:  02/17/17 1633    Specimen:  Blood Updated:  02/17/17 1704     proBNP 144.0 pg/mL     D-dimer, Quantitative [[id number]]  (Normal) Collected:  02/17/17 1633    Specimen:  Blood Updated:  02/17/17 1802     D-Dimer, Quantitative <270 ng/mL (FEU)     Narrative:       Dimer values <500 ng/ml FEU are FDA approved as aid in diagnosis of deep venous thrombosis and pulmonary embolism.  This test should not be used in an exclusion strategy with pretest probability alone.    A recent guideline regarding diagnosis for pulmonary thomboembolism recommends an adjusted exclusion criterion of age x 10 ng/ml FEU for patients >50 years of age ([employer] Intern Med 2015; 163: 701-711).    Amylase [[id number]]  (Normal) Collected:  02/17/17 1633    Specimen:  Blood Updated:  02/17/17 1803     Amylase 70 U/L     Lipase [[id number]]   (Normal) Collected:  02/17/17 1633    Specimen:  Blood Updated:  02/17/17 1803     Lipase 95 U/L     Troponin [[id number]]  (Normal) Collected:  02/17/17 1903    Specimen:  Blood from Arm, Left Updated:  02/17/17 1940     Troponin I <0.012 ng/mL     Green Top (Gel) [[id number]] Collected:  02/17/17 1633    Specimen:  Blood Updated:  02/17/17 2101     Extra Tube Hold for add-ons.       Auto resulted.       Lavender Top [[id number]] Collected:  02/17/17 1633    Specimen:  Blood Updated:  02/17/17 2101     Extra Tube hold for add-on       Auto resulted       [place]lett Draw [[id number]] Collected:  02/17/17 1633    Specimen:  Blood Updated:  02/17/17 2101    Narrative:       The following orders were created for panel order Randlett Draw.  Procedure                               Abnormality         Status                     ---------                               -----------         ------                     Light Blue Top[[id number]]                                    Final result               Green Top (Gel)[[id number]]                                   Final result               Lavender Top[[id number]]                                      Final result               Gold Top - SST[[id number]]                                                                 Please view results for these tests on the individual orders.    Light Blue Top [[id number]] Collected:  02/17/17 1633    Specimen:  Blood Updated:  02/17/17 2101     Extra Tube hold for add-on       Auto resulted           Imaging Results (last 24 hours)     Procedure Component Value Units Date/Time    XR Chest 2 View [[id number]] Collected:  02/17/17 1654     Updated:  02/17/17 1656    Narrative:       Patient Name:  SAMMY PADRON  Patient ID:  [id number]   Ordering:  [facility]  Attending:  Referring:  [facility]  ------------------------------------------------  Chest pain.    COMPARISON: None.    Chest AP and lateral views    Two images were performed. The cardiac  silhouette is within  normal limits. The lung fields are clear. No pleural effusion is  identified. No acute bony abnormality is seen.      Impression:       CONCLUSION: No acute cardiopulmonary pathology is identified.    Electronically signed by:  Celio Handley MD  2/17/2017 4:55  PM CST Workstation: [id number]              Assessment and Plan:    Chest pain possible acute coronary syndrome: Will start KRISTY therapy, trend cardiac enzymes.  Echocardiogram in the morning.  IV fluids.    I discussed the patients findings and my recommendations with:     Viet Browning MD  02/17/17  10:09 PM

## 2017-02-24 ENCOUNTER — OFFICE VISIT (OUTPATIENT)
Dept: CARDIOLOGY | Facility: CLINIC | Age: 62
End: 2017-02-24

## 2017-02-24 ENCOUNTER — APPOINTMENT (OUTPATIENT)
Dept: LAB | Facility: HOSPITAL | Age: 62
End: 2017-02-24

## 2017-02-24 ENCOUNTER — TELEPHONE (OUTPATIENT)
Dept: FAMILY MEDICINE CLINIC | Facility: CLINIC | Age: 62
End: 2017-02-24

## 2017-02-24 ENCOUNTER — OFFICE VISIT (OUTPATIENT)
Dept: FAMILY MEDICINE CLINIC | Facility: CLINIC | Age: 62
End: 2017-02-24

## 2017-02-24 VITALS
BODY MASS INDEX: 34.08 KG/M2 | TEMPERATURE: 98 F | DIASTOLIC BLOOD PRESSURE: 82 MMHG | OXYGEN SATURATION: 98 % | WEIGHT: 224.9 LBS | HEIGHT: 68 IN | SYSTOLIC BLOOD PRESSURE: 122 MMHG | HEART RATE: 84 BPM

## 2017-02-24 VITALS
WEIGHT: 224.1 LBS | DIASTOLIC BLOOD PRESSURE: 68 MMHG | SYSTOLIC BLOOD PRESSURE: 100 MMHG | BODY MASS INDEX: 33.96 KG/M2 | HEIGHT: 68 IN | OXYGEN SATURATION: 96 % | HEART RATE: 75 BPM

## 2017-02-24 DIAGNOSIS — R07.9 CHEST PAIN, UNSPECIFIED TYPE: Primary | ICD-10-CM

## 2017-02-24 DIAGNOSIS — K21.9 GASTROESOPHAGEAL REFLUX DISEASE WITHOUT ESOPHAGITIS: ICD-10-CM

## 2017-02-24 DIAGNOSIS — R06.02 SHORTNESS OF BREATH: ICD-10-CM

## 2017-02-24 DIAGNOSIS — Z00.00 PREVENTATIVE HEALTH CARE: ICD-10-CM

## 2017-02-24 DIAGNOSIS — R21 RASH: ICD-10-CM

## 2017-02-24 DIAGNOSIS — I73.9 CLAUDICATION (HCC): ICD-10-CM

## 2017-02-24 DIAGNOSIS — R60.0 LOCALIZED EDEMA: ICD-10-CM

## 2017-02-24 DIAGNOSIS — R42 DIZZINESS: ICD-10-CM

## 2017-02-24 DIAGNOSIS — R09.89 BRUIT OF RIGHT CAROTID ARTERY: ICD-10-CM

## 2017-02-24 DIAGNOSIS — G47.9 SLEEP DISTURBANCE: ICD-10-CM

## 2017-02-24 DIAGNOSIS — Z11.59 NEED FOR HEPATITIS C SCREENING TEST: ICD-10-CM

## 2017-02-24 DIAGNOSIS — R07.2 PRECORDIAL PAIN: Primary | ICD-10-CM

## 2017-02-24 LAB
ALBUMIN SERPL-MCNC: 4.2 G/DL (ref 3.4–4.8)
ALP SERPL-CCNC: 106 U/L (ref 38–126)
ALT SERPL W P-5'-P-CCNC: 36 U/L (ref 21–72)
AST SERPL-CCNC: 37 U/L (ref 17–59)
BILIRUB CONJ SERPL-MCNC: 0 MG/DL (ref 0–0.3)
BILIRUB INDIRECT SERPL-MCNC: 0.3 MG/DL (ref 0–1.1)
BILIRUB SERPL-MCNC: 0.9 MG/DL (ref 0.2–1.3)
HCV AB SER DONR QL: NEGATIVE
PROT SERPL-MCNC: 7.5 G/DL (ref 6.3–8.6)
T4 FREE SERPL-MCNC: 1.06 NG/DL (ref 0.78–2.19)
TSH SERPL DL<=0.05 MIU/L-ACNC: 1.98 MIU/ML (ref 0.46–4.68)

## 2017-02-24 PROCEDURE — 36415 COLL VENOUS BLD VENIPUNCTURE: CPT | Performed by: FAMILY MEDICINE

## 2017-02-24 PROCEDURE — 84443 ASSAY THYROID STIM HORMONE: CPT | Performed by: FAMILY MEDICINE

## 2017-02-24 PROCEDURE — 84439 ASSAY OF FREE THYROXINE: CPT | Performed by: FAMILY MEDICINE

## 2017-02-24 PROCEDURE — 99204 OFFICE O/P NEW MOD 45 MIN: CPT | Performed by: FAMILY MEDICINE

## 2017-02-24 PROCEDURE — 99202 OFFICE O/P NEW SF 15 MIN: CPT | Performed by: INTERNAL MEDICINE

## 2017-02-24 PROCEDURE — 94010 BREATHING CAPACITY TEST: CPT | Performed by: INTERNAL MEDICINE

## 2017-02-24 PROCEDURE — 80076 HEPATIC FUNCTION PANEL: CPT | Performed by: FAMILY MEDICINE

## 2017-02-24 PROCEDURE — 86803 HEPATITIS C AB TEST: CPT | Performed by: FAMILY MEDICINE

## 2017-02-24 RX ORDER — PANTOPRAZOLE SODIUM 40 MG/1
40 TABLET, DELAYED RELEASE ORAL DAILY
Qty: 30 TABLET | Refills: 5 | Status: SHIPPED | OUTPATIENT
Start: 2017-02-24 | End: 2017-02-24

## 2017-02-24 RX ORDER — PANTOPRAZOLE SODIUM 40 MG/1
40 TABLET, DELAYED RELEASE ORAL DAILY
Qty: 30 TABLET | Refills: 5 | Status: SHIPPED | OUTPATIENT
Start: 2017-02-24 | End: 2021-10-29

## 2017-02-24 NOTE — TELEPHONE ENCOUNTER
----- Message from Deb Hale, DO sent at 2/24/2017 12:17 PM CST -----  Can you let him know that I am sending in a stronger acid reflux pill to his pharmacy for him to take.

## 2017-02-24 NOTE — PATIENT INSTRUCTIONS
Exercise Stress Electrocardiogram  An exercise stress electrocardiogram is a test that is done to evaluate the blood supply to your heart. This test may also be called exercise stress electrocardiography. The test is done while you are walking on a treadmill. The goal of this test is to raise your heart rate. This test is done to find areas of poor blood flow to the heart by determining the extent of coronary artery disease (CAD).    CAD is defined as narrowing in one or more heart (coronary) arteries of more than 70%. If you have an abnormal test result, this may mean that you are not getting adequate blood flow to your heart during exercise. Additional testing may be needed to understand why your test was abnormal.  LET YOUR HEALTH CARE PROVIDER KNOW ABOUT:   · Any allergies you have.  · All medicines you are taking, including vitamins, herbs, eye drops, creams, and over-the-counter medicines.  · Previous problems you or members of your family have had with the use of anesthetics.  · Any blood disorders you have.  · Previous surgeries you have had.  · Medical conditions you have.  · Possibility of pregnancy, if this applies.  RISKS AND COMPLICATIONS  Generally, this is a safe procedure. However, as with any procedure, complications can occur. Possible complications can include:  · Pain or pressure in the following areas:    Chest.    Jaw or neck.    Between your shoulder blades.    Radiating down your left arm.  · Dizziness or light-headedness.  · Shortness of breath.  · Increased or irregular heartbeats.  · Nausea or vomiting.  · Heart attack (rare).  BEFORE THE PROCEDURE  · Avoid all forms of caffeine 24 hours before your test or as directed by your health care provider. This includes coffee, tea (even decaffeinated tea), caffeinated sodas, chocolate, cocoa, and certain pain medicines.  · Follow your health care provider's instructions regarding eating and drinking before the test.  · Take your medicines as  directed at regular times with water unless instructed otherwise. Exceptions may include:    If you have diabetes, ask how you are to take your insulin or pills. It is common to adjust insulin dosing the morning of the test.    If you are taking beta-blocker medicines, it is important to talk to your health care provider about these medicines well before the date of your test. Taking beta-blocker medicines may interfere with the test. In some cases, these medicines need to be changed or stopped 24 hours or more before the test.    If you wear a nitroglycerin patch, it may need to be removed prior to the test. Ask your health care provider if the patch should be removed before the test.  · If you use an inhaler for any breathing condition, bring it with you to the test.  · If you are an outpatient, bring a snack so you can eat right after the stress phase of the test.  · Do not smoke for 4 hours prior to the test or as directed by your health care provider.  · Do not apply lotions, powders, creams, or oils on your chest prior to the test.  · Wear loose-fitting clothes and comfortable shoes for the test. This test involves walking on a treadmill.  PROCEDURE  · Multiple patches (electrodes) will be put on your chest. If needed, small areas of your chest may have to be shaved to get better contact with the electrodes. Once the electrodes are attached to your body, multiple wires will be attached to the electrodes and your heart rate will be monitored.  · Your heart will be monitored both at rest and while exercising.  · You will walk on a treadmill. The treadmill will be started at a slow pace. The treadmill speed and incline will gradually be increased to raise your heart rate.  AFTER THE PROCEDURE  · Your heart rate and blood pressure will be monitored after the test.  · You may return to your normal schedule including diet, activities, and medicines, unless your health care provider tells you otherwise.     This  information is not intended to replace advice given to you by your health care provider. Make sure you discuss any questions you have with your health care provider.     Document Released: 12/15/2001 Document Revised: 12/23/2014 Document Reviewed: 08/25/2014  [employer] Interactive Patient Education ©2016 [employer]

## 2017-02-24 NOTE — TELEPHONE ENCOUNTER
----- Message from Deepika Rush MA sent at 2/24/2017  3:09 PM CST -----      ----- Message -----     From: Deb Hale DO     Sent: 2/24/2017  12:17 PM       To: Deepika Rush MA    Can you let him know that I am sending in a stronger acid reflux pill to his pharmacy for him to take.

## 2017-02-24 NOTE — PROGRESS NOTES
"Subjective   Chief Complaint   Patient presents with   • Establish Care   • Hospital Follow-up     Chest pain, SOA, light headiness      HPI:   Mateo Carlos is a 62 y.o. male who presents for Establish Care and Hospital Follow-up (Chest pain, SOA, light headiness )     New to establish info:  Previous PCP: none (just goes to urgent care as needed).     Here to establish care and for hospital follow up. He was admitted to  2/17-2/18 for chest pain. ACS ruled out. CE's and EKG were negative. D dimer negative. CXR negative. ECHO WNL. He was advised on discharge to f/u with Cardiology (has appt today) and with primary care.   He went to the hospital for chest discomfort, shortness of breath, dizziness. He states he has had symptoms like this in the past, most significantly 20 years ago (at which time he had an ECHO and EKG that he reports were normal). About 10 days before his admission he began having frequent shortness of breath mostly with exertion that was accompanied by a light headed feeling. He denies any \"room spinning\" sensation (other that during his ear irrigation at the ER). He was also experiencing chest discomfort in the center of his chest intermittently, described as a sharp pain that lasts a few minutes and occurs a few times a day. The pain did not radiate and he is unsure if it was assoc with the SOB. It sometimes occurs with rest and sometimes with exertion. He does not notice an association with eating. He sometimes has the pain upon waking up in the am. He does not have any known history of tobacco use, asthma, copd, hyperlipidemia, cardiac disease. He does have occasional acid reflux for which he takes Pepcid or tums as needed. The wife says he also snores a lot at night and sometimes stops breathing or gasps for air in his sleep. He does have some anxiety, but he does not feel that it is significant or that it is contributing to these symptoms. He has not recently traveled, had surgery, or " have any other risks for blood clot.    Shortness of Breath   This is a new problem. The current episode started in the past 7 days. The problem occurs daily. The problem has been unchanged. The average episode lasts 5 minutes. Associated symptoms include chest pain and PND. Pertinent negatives include no abdominal pain, claudication, ear pain, fever, headaches, hemoptysis, leg pain, leg swelling, neck pain, orthopnea, rash, rhinorrhea, sore throat, sputum production, swollen glands, syncope, vomiting or wheezing. The symptoms are aggravated by emotional upset and exercise.     He reports a chronic rash on both his legs for 20 years. Said he had it biopsied at the dermatologist but says they lost the specimen. He uses hydrocortisone and caladryl as needed. He also has sensitivity to certain foods such as chocolate and dairy products.     The following portions of the patient's history were reviewed and updated as appropriate:    Past Medical History   Diagnosis Date   • GERD (gastroesophageal reflux disease)    • Measles        History reviewed. No pertinent past surgical history.    Family History   Problem Relation Age of Onset   • Hypertension Mother    • Cancer Mother    • Cancer Father        Social History     Social History   • Marital status:      Spouse name: N/A   • Number of children: N/A   • Years of education: N/A     Occupational History   • Not on file.     Social History Main Topics   • Smoking status: Never Smoker   • Smokeless tobacco: Not on file   • Alcohol use No   • Drug use: No   • Sexual activity: Not on file     Other Topics Concern   • Not on file     Social History Narrative   • No narrative on file       Medications:    Current Outpatient Prescriptions:   •  aspirin 81 MG tablet, Take 1 tablet by mouth Daily., Disp: 30 tablet, Rfl: 0  •  meclizine (ANTIVERT) 25 MG tablet, Take 1 tablet by mouth 3 (Three) Times a Day As Needed for dizziness., Disp: 20 tablet, Rfl: 0    No Known  "Allergies    Review of Systems   Constitutional: Negative for fever.   HENT: Negative for ear pain, rhinorrhea and sore throat.    Respiratory: Positive for shortness of breath. Negative for hemoptysis, sputum production and wheezing.    Cardiovascular: Positive for chest pain and PND. Negative for orthopnea, claudication, leg swelling and syncope.   Gastrointestinal: Negative for abdominal pain and vomiting.   Musculoskeletal: Negative for neck pain.   Skin: Negative for rash.   Neurological: Negative for headaches.       Objective   Visit Vitals   • /82   • Pulse 84   • Temp 98 °F (36.7 °C)   • Ht 68\" (172.7 cm)   • Wt 224 lb 14.4 oz (102 kg)   • SpO2 98%   • BMI 34.2 kg/m2     Recent Results (from the past 168 hour(s))   Troponin    Collection Time: 02/17/17  4:33 PM   Result Value Ref Range    Troponin I <0.012 <=0.034 ng/mL   Comprehensive Metabolic Panel    Collection Time: 02/17/17  4:33 PM   Result Value Ref Range    Glucose 91 60 - 100 mg/dL    BUN 19 7 - 21 mg/dL    Creatinine 0.98 0.70 - 1.30 mg/dL    Sodium 139 137 - 145 mmol/L    Potassium 4.5 3.5 - 5.1 mmol/L    Chloride 103 95 - 110 mmol/L    CO2 25.0 22.0 - 31.0 mmol/L    Calcium 8.9 8.4 - 10.2 mg/dL    Total Protein 7.6 6.3 - 8.6 g/dL    Albumin 4.30 3.40 - 4.80 g/dL    ALT (SGPT) 37 21 - 72 U/L    AST (SGOT) 28 17 - 59 U/L    Alkaline Phosphatase 97 38 - 126 U/L    Total Bilirubin 0.9 0.2 - 1.3 mg/dL    eGFR Non  Amer 77 49 - 113 mL/min/1.73    Globulin 3.3 2.3 - 3.5 gm/dL    A/G Ratio 1.3 1.1 - 1.8 g/dL    BUN/Creatinine Ratio 19.4 7.0 - 25.0    Anion Gap 11.0 5.0 - 15.0 mmol/L   BNP    Collection Time: 02/17/17  4:33 PM   Result Value Ref Range    proBNP 144.0 0.0 - 900.0 pg/mL   Light Blue Top    Collection Time: 02/17/17  4:33 PM   Result Value Ref Range    Extra Tube hold for add-on    Green Top (Gel)    Collection Time: 02/17/17  4:33 PM   Result Value Ref Range    Extra Tube Hold for add-ons.    Lavender Top    Collection Time: " 02/17/17  4:33 PM   Result Value Ref Range    Extra Tube hold for add-on    CBC Auto Differential    Collection Time: 02/17/17  4:33 PM   Result Value Ref Range    WBC 6.31 3.20 - 9.80 10*3/mm3    RBC 4.72 4.37 - 5.74 10*6/mm3    Hemoglobin 14.8 13.7 - 17.3 g/dL    Hematocrit 42.1 39.0 - 49.0 %    MCV 89.2 80.0 - 98.0 fL    MCH 31.4 26.5 - 34.0 pg    MCHC 35.2 31.5 - 36.3 g/dL    RDW 12.6 11.5 - 14.5 %    RDW-SD 41.2 35.1 - 43.9 fl    MPV 8.9 8.0 - 12.0 fL    Platelets 223 150 - 450 10*3/mm3    Neutrophil % 62.6 37.0 - 80.0 %    Lymphocyte % 23.0 10.0 - 50.0 %    Monocyte % 8.6 0.0 - 12.0 %    Eosinophil % 5.1 0.0 - 7.0 %    Basophil % 0.5 0.0 - 2.0 %    Immature Grans % 0.2 0.0 - 0.5 %    Neutrophils, Absolute 3.96 2.00 - 8.60 10*3/mm3    Lymphocytes, Absolute 1.45 0.60 - 4.20 10*3/mm3    Monocytes, Absolute 0.54 0.00 - 0.90 10*3/mm3    Eosinophils, Absolute 0.32 0.00 - 0.70 10*3/mm3    Basophils, Absolute 0.03 0.00 - 0.20 10*3/mm3    Immature Grans, Absolute 0.01 0.00 - 0.02 10*3/mm3    nRBC 0.0 0.0 - 0.0 /100 WBC   D-dimer, Quantitative    Collection Time: 02/17/17  4:33 PM   Result Value Ref Range    D-Dimer, Quantitative <270 0 - 470 ng/mL (FEU)   Amylase    Collection Time: 02/17/17  4:33 PM   Result Value Ref Range    Amylase 70 50 - 130 U/L   Lipase    Collection Time: 02/17/17  4:33 PM   Result Value Ref Range    Lipase 95 23 - 300 U/L   Troponin    Collection Time: 02/17/17  7:03 PM   Result Value Ref Range    Troponin I <0.012 <=0.034 ng/mL   Troponin    Collection Time: 02/17/17 11:06 PM   Result Value Ref Range    Troponin I <0.012 <=0.034 ng/mL   Lipid Panel    Collection Time: 02/17/17 11:06 PM   Result Value Ref Range    Total Cholesterol 170 0 - 199 mg/dL    Triglycerides 170 20 - 199 mg/dL    HDL Cholesterol 26 (L) 60 - 200 mg/dL    LDL Cholesterol  87 1 - 129 mg/dL    LDL/HDL Ratio 4.23 (H) 0.00 - 3.55   Troponin    Collection Time: 02/18/17  5:11 AM   Result Value Ref Range    Troponin I <0.012  <=0.034 ng/mL   CBC (No Diff)    Collection Time: 02/18/17  5:11 AM   Result Value Ref Range    WBC 5.29 3.20 - 9.80 10*3/mm3    RBC 4.22 (L) 4.37 - 5.74 10*6/mm3    Hemoglobin 13.5 (L) 13.7 - 17.3 g/dL    Hematocrit 38.4 (L) 39.0 - 49.0 %    MCV 91.0 80.0 - 98.0 fL    MCH 32.0 26.5 - 34.0 pg    MCHC 35.2 31.5 - 36.3 g/dL    RDW 12.5 11.5 - 14.5 %    RDW-SD 41.4 35.1 - 43.9 fl    MPV 10.0 8.0 - 12.0 fL    Platelets 190 150 - 450 10*3/mm3   Basic Metabolic Panel    Collection Time: 02/18/17  5:11 AM   Result Value Ref Range    Glucose 99 60 - 100 mg/dL    BUN 16 7 - 21 mg/dL    Creatinine 0.96 0.70 - 1.30 mg/dL    Sodium 141 137 - 145 mmol/L    Potassium 3.9 3.5 - 5.1 mmol/L    Chloride 106 95 - 110 mmol/L    CO2 29.0 22.0 - 31.0 mmol/L    Calcium 8.8 8.4 - 10.2 mg/dL    eGFR Non African Amer 79 >60 mL/min/1.73    BUN/Creatinine Ratio 16.7 7.0 - 25.0    Anion Gap 6.0 5.0 - 15.0 mmol/L   Echocardiogram 2D complete    Collection Time: 02/18/17 11:04 AM   Result Value Ref Range    IVSd 1.1 cm    LVIDd 4.8 cm    LVIDs 2.7 cm    LVPWd 1.1 cm    IVS/LVPW 0.98     FS 44.4 %    EDV(Teich) 106.9 ml    ESV(Teich) 26.1 ml    EF(Teich) 75.6 %    EDV(cubed) 109.8 ml    ESV(cubed) 18.8 ml    EF(cubed) 82.8 %    LV mass(C)d 187.0 grams    SV(Teich) 80.9 ml    SV(cubed) 91.0 ml    Ao root diam 3.1 cm    Ao root area 7.5 cm^2    ACS 2.0 cm    LA dimension 4.0 cm    asc Aorta Diam 2.7 cm    Ao Isth Diam 2.5 cm    LA/Ao 1.3     LVOT diam 2.1 cm    LVOT area 3.4 cm^2    LVOT area(traced) 3.5 cm^2    MV E max yanci 65.6 cm/sec    MV A max yanci 53.0 cm/sec    MV E/A 1.2     MV V2 max 86.4 cm/sec    MV max PG 3.0 mmHg    MV V2 mean 53.6 cm/sec    MV mean PG 1.4 mmHg    MV V2 VTI 21.3 cm    MVA(VTI) 3.6 cm^2    MV P1/2t max yanci 67.8 cm/sec    MV P1/2t 44.1 msec    MVA(P1/2t) 5.0 cm^2    MV dec slope 450.4 cm/sec^2    Ao pk yanci 180.0 cm/sec    Ao max PG 13.0 mmHg    Ao max PG (full) 7.7 mmHg    Ao V2 mean 144.2 cm/sec    Ao mean PG 8.6 mmHg     Ao mean PG (full) 5.7 mmHg    Ao V2 VTI 42.6 cm    GUILLERMO(I,A) 1.8 cm^2    GUILLERMO(I,D) 1.8 cm^2    GUILLERMO(V,A) 2.1 cm^2    GUILLERMO(V,D) 2.1 cm^2    LV V1 max PG 5.2 mmHg    LV V1 mean PG 2.9 mmHg    LV V1 max 114.5 cm/sec    LV V1 mean 79.1 cm/sec    LV V1 VTI 22.8 cm    SV(Ao) 319.4 ml    SV(LVOT) 76.9 ml    PA V2 max 134.4 cm/sec    PA max PG 7.2 mmHg    TR max yanci 212.0 cm/sec    RVSP(TR) 24.3 mmHg    RAP systole 5.0 mmHg    MVA P1/2T LCG 3.2 cm^2   Troponin    Collection Time: 17 12:35 PM   Result Value Ref Range    Troponin I <0.012 <=0.034 ng/mL           Physical Exam   Constitutional: He appears well-developed and well-nourished. No distress.   Neck: No JVD present. Carotid bruit is not present. No thyroid mass and no thyromegaly present.   Cardiovascular: Normal rate, regular rhythm, normal heart sounds and intact distal pulses.  Exam reveals no gallop and no friction rub.    No murmur heard.  Pulmonary/Chest: Effort normal and breath sounds normal. He has no wheezes. He has no rales.   Musculoskeletal: He exhibits no edema.   Neurological: He is alert.   Skin: Skin is warm and dry. He is not diaphoretic.   Erythematous plaques with some scaling on bilateral anterior lower extremities. No vesicles, discharge or drainage.   Psychiatric: He has a normal mood and affect. His behavior is normal.   Nursing note and vitals reviewed.    PFT:  (Read by pulm)  Normal    Assessment/Plan   Mateo Carlos is a 62 y.o. male seen today for the followin. Chest pain, unspecified type  Unclear etiology. Workup in hospital negative so far. I think that cardiac etiology is unlikely as he does not have risk factors. Differential includes GERD, anxiety, cardiac (less likely), costochondritis. Will check his TSH today. I will also start him on protonix. He has appt already with cardiology.     2. Shortness of breath  PFT's done today in clinic were normal (read by Pulm), but patient did have some light headedness during test.  His ECHO was normal. We will get thyroid studies and sleep study    - TSH  - T4, Free  - Ambulatory Referral to Sleep Medicine    3. Need for hepatitis C screening test    - Hepatitis C Antibody    4. Sleep disturbance    - Ambulatory Referral to Sleep Medicine    5. Rash  Unclear etiology.  Will check labs today. Will need biopsy of rash, he will return for this.     - Tissue Transglutaminase, IgA  - Tissue Transglutaminase, IgG  - TSH  - T4, Free    6. Preventative health care    - Hepatic Function Panel    7. Gastroesophageal reflux disease without esophagitis    - pantoprazole (PROTONIX) 40 MG EC tablet; Take 1 tablet by mouth Daily. For acid reflux  Dispense: 30 tablet; Refill: 5    Follow up: Return for Procedure, rash biopsy.          This document has been electronically signed by Deb Hale DO on February 24, 2017 12:29 PM

## 2017-02-24 NOTE — PROGRESS NOTES
Cardiovascular Medicine   Partha Arora M.D., Ph.D., [facility]      No referring provider defined for this encounter.  Thank you for asking me to see Mateo Carlos for CP.    History of Present Illness  This is a 62 y.o. male with:    1. CP  A. R/o MI  B. TTE normal    Chest Pain  Mateo Carlos complains of chest pain.  Patient's been having both exertional and non--exertional chest pain.  These are uncomfortable sensations in his chest.  Sometimes he has a prickly sensation.  No alleviating or aggravating factors.  No prior history of MI or heart failure.  He has remote ischemia evaluation about 20 years ago with a treadmill stress test.  He was told that this was unremarkable.  He was actually recently seen and admitted for chest pain.  Chest x-ray was unremarkable.  He ruled out for MI with serial cardiac enzymes.  He's had no further chest pain episodes, but has been having some exertional dizziness and lightheadedness, but no syncope.  Denies palpitations.    Lower extremity edema  Patient has chronic lower extremity edema.  It is intermittent.  He states this never really resolved.  He denies ever having been tested with ABIs or lower extremity venous duplex.  Denies prior history of DVT or PE.  No overt indications of claudication.  He does endorse lower extremity hair loss.      Review of Systems - History obtained from the patient  Cardiovascular ROS: positive for - chest pain    family history includes Cancer in his father and mother; Hypertension in his mother.     reports that he has never smoked. He does not have any smokeless tobacco history on file. He reports that he does not drink alcohol or use illicit drugs.    No Known Allergies      Current Outpatient Prescriptions:   •  aspirin 81 MG tablet, Take 1 tablet by mouth Daily., Disp: 30 tablet, Rfl: 0  •  meclizine (ANTIVERT) 25 MG tablet, Take 1 tablet by mouth 3 (Three) Times a Day As Needed for dizziness., Disp: 20 tablet, Rfl: 0  •   pantoprazole (PROTONIX) 40 MG EC tablet, Take 1 tablet by mouth Daily. For acid reflux, Disp: 30 tablet, Rfl: 5    Physical Exam:  There were no vitals filed for this visit.  There is no height or weight on file to calculate BMI.    GEN: alert, appears stated age and cooperative  Body Habitus: well-nourished  Neuro: CN II-XII grossly intact.   HEENT: Head: Normocephalic, no lesions, without obvious abnormality.  Neck / Thyroid: Supple, no masses, nodes, nodules or enlargement. No arcus senilis, xanthelasma or xanthomas. PERRL. Normal external ears. No drainage. No thyromegaly. Neck supple. No LAD. Trachea midline. Nose, normal.  JVP: 6 cm of water at 45 degrees HJR: absent      Carotid:  Upstroke: easily palpated bilaterally Volume: Normal.    Carotid Bruit:  None  Subclavian Bruit: Not present.    Lymph: No overt LAD.   Back: Normal.  Chest:  Normal Excursion: Good    I:E: Good  Pulmonary:clear to auscultation, no wheezes, rales or rhonchi, symmetric air entry. Equal chest excursion. Chest physical exam is normal. No tenderness.        Precordium:  No palpable heaves or thrusts. P2 is not palpable.   [place]:  normal size and placement Palpable S4: Not present.   Heart rate: normal  Heart Rhythm: regular     Heart Sounds: S1: normal  S2: normal intensity  S3: absent   S4: absent  Opening Snap: absent  A2-OS:  N/A  Pericardial rub: absent    Ejection click: None      Murmurs: Systolic: none  Diastolic: none  Abdomen: Soft, non-tender, normal bowel sounds; no bruits, organomegaly or masses.  Extremity: no edema, cyanosis  Pulses: Right radial artery has 2+ (normal) pulse and Left radial artery has 2+ (normal) pulse    DATA REVIEWED:       · Left ventricular function is normal  · Estimated EF appears to be in the range of 61 - 65%.  · The right ventricle is normal in size and shape with normal systolic function.  · No evidence of pulmonary hypertension  · No significant valvular abnormality.      Assessment/Plan     1.  Chest pain syndrome. The pain complaints are atypical. I do not think this is cardiac in nature. The patient is Moderate Risk.  Ischemic evaluation:requested.  The patient is able to exercise. EKG is Normal.  -Risks/Benefits discussed.   -A hand out was provided on the type of stress test. Questions answered.   -I have asked the patient to call 911 or be seen in the ED for further CP complaints.   -Will plan on an ischemia evaluation with TST.  -TTE    2.  Lower extremity edema and pain, unable to exclude claudication.  We will arrange ABIs and lower extremity venous duplex    3.  Right carotid bruit.  Carotid duplex    4. Tobacco status: non-smoker     Plan for follow-up: in 1 month      EMR Dragon/Transcription disclaimer:     Much of this encounter note is an electronic transcription. This may permit erroneous, or at times, nonsensical words or phrases to be inadvertently transcribed; Although I have reviewed the note for such errors, some may still exist.

## 2017-02-27 DIAGNOSIS — R06.02 SHORTNESS OF BREATH: Primary | ICD-10-CM

## 2017-02-27 LAB
TTG IGA SER-ACNC: <2 U/ML (ref 0–3)
TTG IGG SER-ACNC: 3 U/ML (ref 0–5)

## 2017-03-21 LAB
MAXIMAL PREDICTED HEART RATE: 157 BPM
STRESS POST ESTIMATED WORKLOAD: 7 METS
STRESS POST EXERCISE DUR MIN: 6 MIN
STRESS POST EXERCISE DUR SEC: 11 SEC
STRESS TARGET HR: 133 BPM

## 2017-03-30 ENCOUNTER — OFFICE VISIT (OUTPATIENT)
Dept: CARDIOLOGY | Facility: CLINIC | Age: 62
End: 2017-03-30

## 2017-03-30 VITALS
SYSTOLIC BLOOD PRESSURE: 118 MMHG | WEIGHT: 227.3 LBS | HEIGHT: 68 IN | BODY MASS INDEX: 34.45 KG/M2 | HEART RATE: 74 BPM | OXYGEN SATURATION: 96 % | DIASTOLIC BLOOD PRESSURE: 74 MMHG

## 2017-03-30 DIAGNOSIS — R94.39 ABNORMAL CARDIOVASCULAR STRESS TEST: ICD-10-CM

## 2017-03-30 DIAGNOSIS — R07.2 PRECORDIAL PAIN: Primary | ICD-10-CM

## 2017-03-30 PROCEDURE — 99214 OFFICE O/P EST MOD 30 MIN: CPT | Performed by: INTERNAL MEDICINE

## 2017-03-30 NOTE — PROGRESS NOTES
Cardiovascular Medicine   Partha Arora M.D., Ph.D., [facility]      No referring provider defined for this encounter.  Thank you for asking me to see Mateo Carlos for CP.    History of Present Illness  This is a 62 y.o. male with:    1. CP  A. R/o MI  B. TST moderate risk  B. TTE normal    Chest Pain  Patient returns for CP. Patient's been having both exertional and non--exertional chest pain.  These are uncomfortable sensations in his chest.  Sometimes he has a prickly sensation.  No alleviating or aggravating factors.  No prior history of MI or heart failure.  He has remote ischemia evaluation about 20 years ago with a treadmill stress test.  He was told that this was unremarkable.  He was actually recently seen and admitted for chest pain.  Chest x-ray was unremarkable.  He ruled out for MI with serial cardiac enzymes.  He's had no further chest pain episodes, but has been having some exertional dizziness and lightheadedness, but no syncope.  Denies palpitations. He was sent for a TST. This was moderate risk.     Lower extremity edema  Patient has chronic lower extremity edema.  It is intermittent.  He states this never really resolved.  He denies ever having been tested with ABIs or lower extremity venous duplex.  Denies prior history of DVT or PE.  No overt indications of claudication.  He does endorse lower extremity hair loss. ABIs were normal.       Review of Systems - History obtained from the patient  Cardiovascular ROS: positive for - chest pain    family history includes Cancer in his father and mother; Hypertension in his mother.     reports that he has never smoked. He does not have any smokeless tobacco history on file. He reports that he does not drink alcohol or use illicit drugs.    No Known Allergies      Current Outpatient Prescriptions:   •  aspirin 81 MG tablet, Take 1 tablet by mouth Daily., Disp: 30 tablet, Rfl: 0  •  meclizine (ANTIVERT) 25 MG tablet, Take 1 tablet by mouth 3 (Three)  Times a Day As Needed for dizziness., Disp: 20 tablet, Rfl: 0  •  pantoprazole (PROTONIX) 40 MG EC tablet, Take 1 tablet by mouth Daily. For acid reflux, Disp: 30 tablet, Rfl: 5    Physical Exam:  Vitals:    03/30/17 1332   BP: 118/74   Pulse: 74   SpO2: 96%     Body mass index is 34.56 kg/(m^2).    GEN: alert, appears stated age and cooperative  Body Habitus: well-nourished  Neuro: CN II-XII grossly intact.   HEENT: Head: Normocephalic, no lesions, without obvious abnormality.  Neck / Thyroid: Supple, no masses, nodes, nodules or enlargement. No arcus senilis, xanthelasma or xanthomas. PERRL. Normal external ears. No drainage. No thyromegaly. Neck supple. No LAD. Trachea midline. Nose, normal.  JVP: 6 cm of water at 45 degrees HJR: absent      Carotid:  Upstroke: easily palpated bilaterally Volume: Normal.    Carotid Bruit:  None  Subclavian Bruit: Not present.    Lymph: No overt LAD.   Back: Normal.  Chest:  Normal Excursion: Good    I:E: Good  Pulmonary:clear to auscultation, no wheezes, rales or rhonchi, symmetric air entry. Equal chest excursion. Chest physical exam is normal. No tenderness.        Precordium:  No palpable heaves or thrusts. P2 is not palpable.   [place]:  normal size and placement Palpable S4: Not present.   Heart rate: normal  Heart Rhythm: regular     Heart Sounds: S1: normal  S2: normal intensity  S3: absent   S4: absent  Opening Snap: absent  A2-OS:  N/A  Pericardial rub: absent    Ejection click: None      Murmurs: Systolic: none  Diastolic: none  Abdomen: Soft, non-tender, normal bowel sounds; no bruits, organomegaly or masses.  Extremity: no edema, cyanosis  Pulses: Right radial artery has 2+ (normal) pulse and Left radial artery has 2+ (normal) pulse    DATA REVIEWED:     · Right Conclusion: The right EUGENIO is normal.  · Left Conclusion: The left EUGENIO is normal.    · Appears negative for DVT of the lower extremities  · Appears to be negative for reflux of the GSV bilaterally  · Appears to be  reflux of the left DEEP system      · Left ventricular function is normal  · Estimated EF appears to be in the range of 61 - 65%.  · The right ventricle is normal in size and shape with normal systolic function.  · No evidence of pulmonary hypertension  · No significant valvular abnormality.    · Normal proximal right internal carotid artery.  · Normal proximal left internal carotid artery.  · Right Conclusion: The right EUGENIO is normal.  · Left Conclusion: The left EUGENIO is normal.    · Stress Description: A stress test was performed following the Mateo protocol.  · The patient experienced no angina during the stress test.  · The Duke Treadmill Score of 3.68 is consistent with a moderate risk for ischemic heart disease.  · Findings consistent with an abnormal ECG stress test.    · Left ventricular function is normal  · Estimated EF appears to be in the range of 61 - 65%.  · The right ventricle is normal in size and shape with normal systolic function.  · No evidence of pulmonary hypertension  · No significant valvular abnormality.        · Left ventricular function is normal  · Estimated EF appears to be in the range of 61 - 65%.  · The right ventricle is normal in size and shape with normal systolic function.  · No evidence of pulmonary hypertension  · No significant valvular abnormality.    Assessment/Plan     1. Chest pain syndrome. The pain complaints are atypical. TST was moderate risk.   -Treadmill MPS    2.  Lower extremity edema and pain with normal LE perrfusion. ABIs normal.   PCP follow-up    3.  Right carotid bruit with normal Duplex  Monitor    4. Tobacco status: non-smoker     Plan for follow-up: in 1 month       EMR Dragon/Transcription disclaimer:     Much of this encounter note is an electronic transcription. This may permit erroneous, or at times, nonsensical words or phrases to be inadvertently transcribed; Although I have reviewed the note for such errors, some may still exist.

## 2017-03-30 NOTE — PATIENT INSTRUCTIONS
MUGA Scan  A MUGA scan (multigated acquisition scan) is a test that makes pictures of your heart at specific times while it beats. The test uses a radioactive dye (tracer) that is injected into your bloodstream. The dye makes it possible for your health care provider to see the red blood cells passing through your heart.  A MUGA scan shows:  · How much blood your heart is pumping out with each heartbeat.  · How well your ventricles are working. Ventricles are chambers in the lower part of your heart. They pump blood to your lungs and to the rest of your body.  A MUGA scan may be done to determine:  · What is causing heart symptoms, such as chest pain.  · If the arteries that supply your heart are blocked.  · If you have heart valve disease.  · If your heart is damaged from a heart attack.  · If your heart has trouble pumping blood.  LET YOUR HEALTH CARE PROVIDER KNOW ABOUT:  · Any allergies you have.  · All medicines you are taking, including vitamins, herbs, eye drops, creams, and over-the-counter medicines.  · Any blood disorders you have.  · Previous surgeries you have had.  · Medical conditions you have.  · If you are pregnant, if you might be pregnant, or if you are breastfeeding. This is very important.  RISKS AND COMPLICATIONS  Generally this is a safe procedure. However, problems can occur and include:  · An allergic reaction to the tracer. This side effect is rare.  · Pain and redness at the IV site.  · Potential harm to your baby if you are pregnant or breastfeeding.  BEFORE THE PROCEDURE  · Do not take your regular medicines before your procedure if your health care provider asks you not to. Ask your health care provider about changing or stopping those medicines.  · Do not drink any beverages that have caffeine for several hours before the scan. Beverages containing caffeine include coffee, tea, and soft drinks.  · Ask your health care provider if you will be having an exercise scan with your MUGA  scan.  · If you will be having an exercise scan with your MUGA scan, do not eat or drink anything except water for 4 hours before the scan.  · Wear loose, comfortable clothing.  PROCEDURE  · You will be asked to lie down on an exam table.  · An IV tube will be put into one of your veins. Medicine will flow through the tube during the procedure.  · Discs called electrodes will be attached to your chest, arms, and legs. These will be connected with wires to a machine.  · A small amount of tracer will be injected through your IV. You may feel a cold sensation in your arm as it runs through your IV.  · A camera will be placed over your chest. It will take a series of pictures while you rest.  · If you need to have an exercise scan, you will walk on a treadmill or ride a stationary bicycle. Then you will be asked to lie back down on the exam table for more pictures.  · After all of the pictures have been taken, your IV tube will be removed.  AFTER THE PROCEDURE  · Drink enough fluid to keep your urine clear or pale yellow. This helps to flush the tracer out of your body.  · It is your responsibility to get your test results. Ask the lab or department performing the test when and how you will get your results.  · Keep all follow-up visits as directed by your health care provider. This is important.     This information is not intended to replace advice given to you by your health care provider. Make sure you discuss any questions you have with your health care provider.     Document Released: 02/06/2007 Document Revised: 01/08/2016 Document Reviewed: 05/07/2015  [employer] Patient Education ©2016 [employer]

## 2017-04-03 LAB
BH CV LOWER ARTERIAL LEFT ABI RATIO: 1.2
BH CV LOWER ARTERIAL LEFT DORSALIS PEDIS SYS MAX: 150 MMHG
BH CV LOWER ARTERIAL LEFT POST TIBIAL SYS MAX: 154 MMHG
BH CV LOWER ARTERIAL RIGHT ABI RATIO: 1.25
BH CV LOWER ARTERIAL RIGHT DORSALIS PEDIS SYS MAX: 141 MMHG
BH CV LOWER ARTERIAL RIGHT POST TIBIAL SYS MAX: 160 MMHG
BH CV LOWER VASCULAR LEFT COMMON FEMORAL AUGMENT: NORMAL
BH CV LOWER VASCULAR LEFT COMMON FEMORAL COMPETENT: NORMAL
BH CV LOWER VASCULAR LEFT COMMON FEMORAL COMPRESS: NORMAL
BH CV LOWER VASCULAR LEFT DISTAL FEMORAL AUGMENT: NORMAL
BH CV LOWER VASCULAR LEFT DISTAL FEMORAL COMPETENT: NORMAL
BH CV LOWER VASCULAR LEFT DISTAL FEMORAL COMPRESS: NORMAL
BH CV LOWER VASCULAR LEFT GREATER SAPH AK AUGMENT: NORMAL
BH CV LOWER VASCULAR LEFT GREATER SAPH AK COMPETENT: NORMAL
BH CV LOWER VASCULAR LEFT GREATER SAPH AK COMPRESS: NORMAL
BH CV LOWER VASCULAR LEFT GREATER SAPH BK AUGMENT: NORMAL
BH CV LOWER VASCULAR LEFT GREATER SAPH BK COMPETENT: NORMAL
BH CV LOWER VASCULAR LEFT GREATER SAPH BK COMPRESS: NORMAL
BH CV LOWER VASCULAR LEFT MID FEMORAL AUGMENT: NORMAL
BH CV LOWER VASCULAR LEFT MID FEMORAL COMPETENT: NORMAL
BH CV LOWER VASCULAR LEFT MID FEMORAL COMPRESS: NORMAL
BH CV LOWER VASCULAR LEFT PERONEAL AUGMENT: NORMAL
BH CV LOWER VASCULAR LEFT POPLITEAL AUGMENT: NORMAL
BH CV LOWER VASCULAR LEFT POPLITEAL COMPETENT: NORMAL
BH CV LOWER VASCULAR LEFT POPLITEAL COMPRESS: NORMAL
BH CV LOWER VASCULAR LEFT POSTERIOR TIBIAL AUGMENT: NORMAL
BH CV LOWER VASCULAR LEFT PROFUNDA FEMORAL AUGMENT: NORMAL
BH CV LOWER VASCULAR LEFT PROXIMAL FEMORAL AUGMENT: NORMAL
BH CV LOWER VASCULAR LEFT PROXIMAL FEMORAL COMPETENT: NORMAL
BH CV LOWER VASCULAR LEFT PROXIMAL FEMORAL COMPRESS: NORMAL
BH CV LOWER VASCULAR RIGHT COMMON FEMORAL AUGMENT: NORMAL
BH CV LOWER VASCULAR RIGHT COMMON FEMORAL COMPRESS: NORMAL
BH CV LOWER VASCULAR RIGHT DISTAL FEMORAL AUGMENT: NORMAL
BH CV LOWER VASCULAR RIGHT DISTAL FEMORAL COMPETENT: NORMAL
BH CV LOWER VASCULAR RIGHT DISTAL FEMORAL COMPRESS: NORMAL
BH CV LOWER VASCULAR RIGHT GREATER SAPH AK AUGMENT: NORMAL
BH CV LOWER VASCULAR RIGHT GREATER SAPH AK COMPETENT: NORMAL
BH CV LOWER VASCULAR RIGHT GREATER SAPH AK COMPRESS: NORMAL
BH CV LOWER VASCULAR RIGHT GREATER SAPH BK AUGMENT: NORMAL
BH CV LOWER VASCULAR RIGHT GREATER SAPH BK COMPETENT: NORMAL
BH CV LOWER VASCULAR RIGHT GREATER SAPH BK COMPRESS: NORMAL
BH CV LOWER VASCULAR RIGHT MID FEMORAL AUGMENT: NORMAL
BH CV LOWER VASCULAR RIGHT MID FEMORAL COMPETENT: NORMAL
BH CV LOWER VASCULAR RIGHT MID FEMORAL COMPRESS: NORMAL
BH CV LOWER VASCULAR RIGHT PERONEAL AUGMENT: NORMAL
BH CV LOWER VASCULAR RIGHT PERONEAL COMPRESS: NORMAL
BH CV LOWER VASCULAR RIGHT POPLITEAL AUGMENT: NORMAL
BH CV LOWER VASCULAR RIGHT POPLITEAL COMPETENT: NORMAL
BH CV LOWER VASCULAR RIGHT POPLITEAL COMPRESS: NORMAL
BH CV LOWER VASCULAR RIGHT POSTERIOR TIBIAL AUGMENT: NORMAL
BH CV LOWER VASCULAR RIGHT POSTERIOR TIBIAL COMPRESS: NORMAL
BH CV LOWER VASCULAR RIGHT PROFUNDA FEMORAL AUGMENT: NORMAL
BH CV LOWER VASCULAR RIGHT PROXIMAL FEMORAL AUGMENT: NORMAL
BH CV LOWER VASCULAR RIGHT PROXIMAL FEMORAL COMPETENT: NORMAL
BH CV LOWER VASCULAR RIGHT PROXIMAL FEMORAL COMPRESS: NORMAL
BH CV XLRA MEAS LEFT DIST CCA EDV: 30 CM/SEC
BH CV XLRA MEAS LEFT DIST CCA PSV: 104 CM/SEC
BH CV XLRA MEAS LEFT DIST ICA EDV: 39 CM/SEC
BH CV XLRA MEAS LEFT DIST ICA PSV: 82 CM/SEC
BH CV XLRA MEAS LEFT MID ICA EDV: 38 CM/SEC
BH CV XLRA MEAS LEFT MID ICA PSV: 72 CM/SEC
BH CV XLRA MEAS LEFT PROX CCA EDV: 38 CM/SEC
BH CV XLRA MEAS LEFT PROX CCA PSV: 115 CM/SEC
BH CV XLRA MEAS LEFT PROX ECA EDV: 18 CM/SEC
BH CV XLRA MEAS LEFT PROX ECA PSV: 117 CM/SEC
BH CV XLRA MEAS LEFT PROX ICA EDV: 13 CM/SEC
BH CV XLRA MEAS LEFT PROX ICA PSV: 65 CM/SEC
BH CV XLRA MEAS LEFT VERTEBRAL A EDV: 16 CM/SEC
BH CV XLRA MEAS LEFT VERTEBRAL A PSV: 45 CM/SEC
BH CV XLRA MEAS RIGHT DIST CCA EDV: 28 CM/SEC
BH CV XLRA MEAS RIGHT DIST CCA PSV: 94 CM/SEC
BH CV XLRA MEAS RIGHT DIST ICA EDV: 43 CM/SEC
BH CV XLRA MEAS RIGHT DIST ICA PSV: 91 CM/SEC
BH CV XLRA MEAS RIGHT MID ICA EDV: 30 CM/SEC
BH CV XLRA MEAS RIGHT MID ICA PSV: 74 CM/SEC
BH CV XLRA MEAS RIGHT PROX CCA EDV: 26 CM/SEC
BH CV XLRA MEAS RIGHT PROX CCA PSV: 133 CM/SEC
BH CV XLRA MEAS RIGHT PROX ECA EDV: 21 CM/SEC
BH CV XLRA MEAS RIGHT PROX ECA PSV: 144 CM/SEC
BH CV XLRA MEAS RIGHT PROX ICA EDV: 32 CM/SEC
BH CV XLRA MEAS RIGHT PROX ICA PSV: 94 CM/SEC
UPPER ARTERIAL LEFT ARM BRACHIAL SYS MAX: 128 MMHG
UPPER ARTERIAL RIGHT ARM BRACHIAL SYS MAX: 113 MMHG

## 2017-04-13 ENCOUNTER — APPOINTMENT (OUTPATIENT)
Dept: NUCLEAR MEDICINE | Facility: HOSPITAL | Age: 62
End: 2017-04-13
Attending: INTERNAL MEDICINE

## 2017-04-13 ENCOUNTER — APPOINTMENT (OUTPATIENT)
Dept: CARDIOLOGY | Facility: HOSPITAL | Age: 62
End: 2017-04-13
Attending: INTERNAL MEDICINE

## 2017-04-24 ENCOUNTER — HOSPITAL ENCOUNTER (OUTPATIENT)
Dept: CARDIOLOGY | Facility: HOSPITAL | Age: 62
Discharge: HOME | End: 2017-04-24
Attending: INTERNAL MEDICINE

## 2017-04-24 ENCOUNTER — HOSPITAL ENCOUNTER (OUTPATIENT)
Dept: NUCLEAR MEDICINE | Facility: HOSPITAL | Age: 62
Discharge: HOME | End: 2017-04-24
Attending: INTERNAL MEDICINE

## 2017-04-24 LAB
BH CV STRESS BP STAGE 1: NORMAL
BH CV STRESS BP STAGE 2: NORMAL
BH CV STRESS DURATION MIN STAGE 1: 3
BH CV STRESS DURATION MIN STAGE 2: 3
BH CV STRESS DURATION MIN STAGE 3: 46
BH CV STRESS DURATION SEC STAGE 1: 0
BH CV STRESS DURATION SEC STAGE 2: 0
BH CV STRESS DURATION SEC STAGE 3: 0
BH CV STRESS GRADE STAGE 1: 10
BH CV STRESS GRADE STAGE 2: 12
BH CV STRESS GRADE STAGE 3: 14
BH CV STRESS HR STAGE 1: 107
BH CV STRESS HR STAGE 2: 134
BH CV STRESS HR STAGE 3: 148
BH CV STRESS METS STAGE 1: 5
BH CV STRESS METS STAGE 2: 7.5
BH CV STRESS METS STAGE 3: 10
BH CV STRESS PROTOCOL 1: NORMAL
BH CV STRESS RECOVERY BP: NORMAL MMHG
BH CV STRESS RECOVERY HR: 82 BPM
BH CV STRESS SPEED STAGE 1: 1.7
BH CV STRESS SPEED STAGE 2: 2.5
BH CV STRESS SPEED STAGE 3: 3.4
BH CV STRESS STAGE 1: 1
BH CV STRESS STAGE 2: 2
BH CV STRESS STAGE 3: 3
LV EF NUC BP: 72 %
MAXIMAL PREDICTED HEART RATE: 157 BPM
PERCENT MAX PREDICTED HR: 94.27 %
STRESS BASELINE BP: NORMAL MMHG
STRESS BASELINE HR: 77 BPM
STRESS PERCENT HR: 111 %
STRESS POST ESTIMATED WORKLOAD: 8.1 METS
STRESS POST EXERCISE DUR MIN: 6 MIN
STRESS POST EXERCISE DUR SEC: 45 SEC
STRESS POST PEAK BP: NORMAL MMHG
STRESS POST PEAK HR: 148 BPM
STRESS TARGET HR: 133 BPM

## 2017-04-24 PROCEDURE — 93018 CV STRESS TEST I&R ONLY: CPT | Performed by: INTERNAL MEDICINE

## 2017-04-24 PROCEDURE — A9500 TC99M SESTAMIBI: HCPCS | Performed by: INTERNAL MEDICINE

## 2017-04-24 PROCEDURE — 78452 HT MUSCLE IMAGE SPECT MULT: CPT | Performed by: INTERNAL MEDICINE

## 2017-04-24 PROCEDURE — 0 TECHNETIUM SESTAMIBI: Performed by: INTERNAL MEDICINE

## 2017-04-24 PROCEDURE — 78452 HT MUSCLE IMAGE SPECT MULT: CPT

## 2017-04-24 PROCEDURE — 93017 CV STRESS TEST TRACING ONLY: CPT

## 2017-04-24 PROCEDURE — 93016 CV STRESS TEST SUPVJ ONLY: CPT | Performed by: INTERNAL MEDICINE

## 2017-04-24 RX ADMIN — Medication 1 DOSE: at 09:39

## 2017-04-24 RX ADMIN — Medication 1 DOSE: at 08:20

## 2017-05-18 ENCOUNTER — OFFICE VISIT (OUTPATIENT)
Dept: CARDIOLOGY | Facility: CLINIC | Age: 62
End: 2017-05-18

## 2017-05-18 VITALS
OXYGEN SATURATION: 98 % | SYSTOLIC BLOOD PRESSURE: 136 MMHG | HEART RATE: 71 BPM | HEIGHT: 68 IN | DIASTOLIC BLOOD PRESSURE: 83 MMHG | BODY MASS INDEX: 34.4 KG/M2 | WEIGHT: 227 LBS

## 2017-05-18 DIAGNOSIS — R07.2 PRECORDIAL PAIN: Primary | ICD-10-CM

## 2017-05-18 DIAGNOSIS — R60.0 LOCALIZED EDEMA: ICD-10-CM

## 2017-05-18 PROCEDURE — 99213 OFFICE O/P EST LOW 20 MIN: CPT | Performed by: INTERNAL MEDICINE

## 2017-07-07 ENCOUNTER — TELEPHONE (OUTPATIENT)
Dept: SLEEP MEDICINE | Facility: HOSPITAL | Age: 62
End: 2017-07-07

## 2017-07-07 NOTE — TELEPHONE ENCOUNTER
Attempted to contact patient to schedule sleep med referral.  No answer.  Letter sent.  Referral closed.

## 2020-09-02 ENCOUNTER — HOSPITAL ENCOUNTER (OUTPATIENT)
Facility: HOSPITAL | Age: 65
Setting detail: HOSPITAL OUTPATIENT SURGERY
End: 2020-09-02
Attending: INTERNAL MEDICINE | Admitting: INTERNAL MEDICINE

## 2020-11-13 ENCOUNTER — APPOINTMENT (OUTPATIENT)
Dept: LAB | Facility: HOSPITAL | Age: 65
End: 2020-11-13

## 2021-10-29 RX ORDER — LISINOPRIL 5 MG/1
5 TABLET ORAL DAILY
COMMUNITY

## 2021-10-29 RX ORDER — TADALAFIL 5 MG/1
5 TABLET ORAL DAILY PRN
COMMUNITY

## 2021-10-29 RX ORDER — CETIRIZINE HYDROCHLORIDE 10 MG/1
10 TABLET ORAL DAILY
COMMUNITY

## 2021-11-01 ENCOUNTER — ANESTHESIA (OUTPATIENT)
Dept: GASTROENTEROLOGY | Facility: HOSPITAL | Age: 66
End: 2021-11-01

## 2021-11-01 ENCOUNTER — ANESTHESIA EVENT (OUTPATIENT)
Dept: GASTROENTEROLOGY | Facility: HOSPITAL | Age: 66
End: 2021-11-01

## 2021-11-01 ENCOUNTER — HOSPITAL ENCOUNTER (OUTPATIENT)
Facility: HOSPITAL | Age: 66
Setting detail: HOSPITAL OUTPATIENT SURGERY
Discharge: HOME | End: 2021-11-01
Attending: INTERNAL MEDICINE | Admitting: INTERNAL MEDICINE

## 2021-11-01 VITALS
HEIGHT: 68 IN | BODY MASS INDEX: 31.52 KG/M2 | OXYGEN SATURATION: 98 % | HEART RATE: 85 BPM | DIASTOLIC BLOOD PRESSURE: 61 MMHG | SYSTOLIC BLOOD PRESSURE: 103 MMHG | TEMPERATURE: 98.2 F | WEIGHT: 208 LBS | RESPIRATION RATE: 18 BRPM

## 2021-11-01 DIAGNOSIS — Z12.11 ENCOUNTER FOR SCREENING COLONOSCOPY: ICD-10-CM

## 2021-11-01 PROCEDURE — 25010000002 PROPOFOL 10 MG/ML EMULSION: Performed by: NURSE ANESTHETIST, CERTIFIED REGISTERED

## 2021-11-01 RX ORDER — LIDOCAINE HYDROCHLORIDE 20 MG/ML
INJECTION, SOLUTION INTRAVENOUS AS NEEDED
Status: DISCONTINUED | OUTPATIENT
Start: 2021-11-01 | End: 2021-11-01 | Stop reason: SURG

## 2021-11-01 RX ORDER — PROPOFOL 10 MG/ML
VIAL (ML) INTRAVENOUS AS NEEDED
Status: DISCONTINUED | OUTPATIENT
Start: 2021-11-01 | End: 2021-11-01 | Stop reason: SURG

## 2021-11-01 RX ORDER — MEPERIDINE HYDROCHLORIDE 25 MG/ML
12.5 INJECTION INTRAMUSCULAR; INTRAVENOUS; SUBCUTANEOUS
Status: DISCONTINUED | OUTPATIENT
Start: 2021-11-01 | End: 2021-11-01 | Stop reason: HOSPADM

## 2021-11-01 RX ORDER — DEXTROSE AND SODIUM CHLORIDE 5; .45 G/100ML; G/100ML
30 INJECTION, SOLUTION INTRAVENOUS CONTINUOUS PRN
Status: DISCONTINUED | OUTPATIENT
Start: 2021-11-01 | End: 2021-11-01 | Stop reason: HOSPADM

## 2021-11-01 RX ORDER — PROMETHAZINE HYDROCHLORIDE 25 MG/1
25 TABLET ORAL ONCE AS NEEDED
Status: DISCONTINUED | OUTPATIENT
Start: 2021-11-01 | End: 2021-11-01 | Stop reason: HOSPADM

## 2021-11-01 RX ORDER — PROMETHAZINE HYDROCHLORIDE 25 MG/1
25 SUPPOSITORY RECTAL ONCE AS NEEDED
Status: DISCONTINUED | OUTPATIENT
Start: 2021-11-01 | End: 2021-11-01 | Stop reason: HOSPADM

## 2021-11-01 RX ORDER — ONDANSETRON 2 MG/ML
4 INJECTION INTRAMUSCULAR; INTRAVENOUS ONCE AS NEEDED
Status: DISCONTINUED | OUTPATIENT
Start: 2021-11-01 | End: 2021-11-01 | Stop reason: HOSPADM

## 2021-11-01 RX ADMIN — PROPOFOL 20 MG: 10 INJECTION, EMULSION INTRAVENOUS at 13:23

## 2021-11-01 RX ADMIN — PROPOFOL 20 MG: 10 INJECTION, EMULSION INTRAVENOUS at 13:25

## 2021-11-01 RX ADMIN — PROPOFOL 50 MG: 10 INJECTION, EMULSION INTRAVENOUS at 13:21

## 2021-11-01 RX ADMIN — PROPOFOL 20 MG: 10 INJECTION, EMULSION INTRAVENOUS at 13:27

## 2021-11-01 RX ADMIN — LIDOCAINE HYDROCHLORIDE 100 MG: 20 INJECTION, SOLUTION INTRAVENOUS at 13:20

## 2021-11-01 RX ADMIN — DEXTROSE AND SODIUM CHLORIDE 30 ML/HR: 5; 450 INJECTION, SOLUTION INTRAVENOUS at 12:29

## 2021-11-01 RX ADMIN — PROPOFOL 50 MG: 10 INJECTION, EMULSION INTRAVENOUS at 13:20

## 2021-11-01 RX ADMIN — PROPOFOL 10 MG: 10 INJECTION, EMULSION INTRAVENOUS at 13:29

## 2021-11-01 NOTE — ANESTHESIA POSTPROCEDURE EVALUATION
Patient: Mateo Carlos    Procedure Summary     Date: 11/01/21 Room / Location: [facility] ENDOSCOPY 2 / [facility] ENDOSCOPY    Anesthesia Start: 1304 Anesthesia Stop: 1334    Procedure: COLONOSCOPY (N/A ) Diagnosis:       Encounter for screening colonoscopy      (Encounter for screening colonoscopy [Z12.11])    Surgeons: Ubaldo Feliciano DO Provider: Anderson Nicholas CRNA    Anesthesia Type: MAC ASA Status: 3          Anesthesia Type: MAC    Vitals  No vitals data found for the desired time range.          Post Anesthesia Care and Evaluation    Patient location during evaluation: bedside  Patient participation: complete - patient cannot participate  Level of consciousness: awake  Pain score: 0  Pain management: adequate  Airway patency: patent  Anesthetic complications: No anesthetic complications  PONV Status: none  Cardiovascular status: acceptable  Respiratory status: acceptable  Hydration status: acceptable

## 2021-11-01 NOTE — H&P
Rika Maher DO,[facility]  Gastroenterology  Hepatology  Endoscopy  Board Certified in Internal Medicine and gastroenterology  [address], [address]  [place]. [address]  - [phone]   F - [phone]     GASTROENTEROLOGY HISTORY AND PHYSICAL  NOTE   RIKA MAHER DO.         SUBJECTIVE:   11/1/2021    Name: Mateo Carlos  DOD: 1955        Chief Complaint:       Subjective : Screen for colon cancer    Patient is 66 y.o. male presents with desire for elective colonoscopy.      ROS/HISTORY/ CURRENT MEDICATIONS/OBJECTIVE/VS/PE:   Review of Systems:  All systems unremarkable unless specified below.  Constitutional   HENT  Eyes   Respiratory    Cardiovascular  Gastrointestinal   Endocrine  Genitourinary    Musculoskeletal   Skin  Allergic/Immunologic    Neurological    Hematological  Psychiatric/Behavioral    History:     Past Medical History:   Diagnosis Date   • Chest pain    • Dizziness    • GERD (gastroesophageal reflux disease)    • Hypertension    • Measles    • Shortness of breath      Past Surgical History:   Procedure Laterality Date   • PROSTATECTOMY  04/2021     Family History   Problem Relation Age of Onset   • Hypertension Mother    • Cancer Mother    • Cancer Father      Social History     Tobacco Use   • Smoking status: Never Smoker   • Smokeless tobacco: Never Used   Vaping Use   • Vaping Use: Never used   Substance Use Topics   • Alcohol use: No   • Drug use: No     Prior to Admission medications    Medication Sig Start Date End Date Taking? Authorizing Provider   cetirizine (zyrTEC) 10 MG tablet Take 10 mg by mouth Daily.   Yes Erich Blanca MD   lisinopril (PRINIVIL,ZESTRIL) 5 MG tablet Take 5 mg by mouth Daily.   Yes Erich Blanca MD   tadalafil (CIALIS) 5 MG tablet Take 5 mg by mouth Daily As Needed for Erectile Dysfunction.    ProviderErich MD     Allergies:  Augmentin [amoxicillin-pot clavulanate]    I have reviewed the patients medical  history, surgical history and family history in the available medical record system.     Current Medications:     Current Facility-Administered Medications   Medication Dose Route Frequency Provider Last Rate Last Admin   • dextrose 5 % and sodium chloride 0.45 % infusion  30 mL/hr Intravenous Continuous PRN Ubaldo Feliciano DO 30 mL/hr at 11/01/21 1229 30 mL/hr at 11/01/21 1229       Objective     Physical Exam:   Temp:  [97.7 °F (36.5 °C)] 97.7 °F (36.5 °C)  Heart Rate:  [76] 76  Resp:  [18] 18  BP: (133)/(78) 133/78    Physical Exam:  General Appearance:    Alert, cooperative, in no acute distress   Head:    Normocephalic, without obvious abnormality, atraumatic   Eyes:            Lids and lashes normal, conjunctivae and sclerae normal, no icterus, no pallor, corneas clear, PERRLA   Ears:    Ears appear intact with no abnormalities noted   Throat:   No oral lesions, no thrush, oral mucosa moist   Neck:   No adenopathy, supple, trachea midline, no thyromegaly, no  carotid bruit, no JVD   Back:     No kyphosis present, no scoliosis present, no skin lesions,   erythema or scars, no tenderness to percussion or                 palpation,  range of motion normal   Lungs:     Clear to auscultation,respirations regular, even and         unlabored    Heart:    Regular rhythm and normal rate, normal S1 and S2, no  murmur, no gallop, no rub, no click   Breast Exam:    Deferred   Abdomen:     Normal bowel sounds, no masses, no organomegaly, soft  nontender, nondistended, no guarding, no rebound                 tenderness   Genitalia:    Deferred   Extremities:   Moves all extremities well, no edema, no cyanosis, no          redness   Pulses:   Pulses palpable and equal bilaterally   Skin:   No bleeding, bruising or rash   Lymph nodes:   No palpable adenopathy   Neurologic:   Cranial nerves 2 - 12 grossly intact, sensation intact, DTR     present and equal bilaterally      Results Review:     Lab Results   Component Value  Date    WBC 5.29 02/18/2017    WBC 6.31 02/17/2017    HGB 12.3 (L) 04/16/2021    HGB 13.4 (L) 04/15/2021    HGB 13.5 (L) 02/18/2017    HCT 35.4 (L) 04/16/2021    HCT 38.9 (L) 04/15/2021    HCT 38.4 (L) 02/18/2017     02/18/2017     02/17/2017             No results found for: LIPASE  No results found for: INR  No results found for: TISSUECX    Radiology Review:  Imaging Results (Last 72 Hours)     ** No results found for the last 72 hours. **           I reviewed the patient's new clinical results.  I reviewed the patient's new imaging results and agree with the interpretation.     ASSESSMENT/PLAN:   ASSESSMENT:  1.  Screen for colon cancer    PLAN:  1.  Colonoscopy    Risk and benefits associated with the procedure are reviewed with the patient.  The patient wished to proceed     Ubaldo Feliciano DO  11/01/21  13:18 CDT

## 2021-11-01 NOTE — ANESTHESIA PREPROCEDURE EVALUATION
Anesthesia Evaluation     NPO Solid Status: > 8 hours  NPO Liquid Status: > 8 hours           Airway   Mallampati: II  No difficulty expected  Dental    (+) poor dentition    Pulmonary - normal exam   (+) shortness of breath,   Cardiovascular - normal exam    (+) hypertension,       Neuro/Psych  (+) dizziness/light headedness,     GI/Hepatic/Renal/Endo    (+)  GERD,      Musculoskeletal     Abdominal    Substance History      OB/GYN          Other                        Anesthesia Plan    ASA 3     MAC     intravenous induction     Anesthetic plan, all risks, benefits, and alternatives have been provided, discussed and informed consent has been obtained with: patient.

## 2021-11-04 LAB
LAB AP CASE REPORT: NORMAL
PATH REPORT.FINAL DX SPEC: NORMAL

## (undated) DEVICE — SINGLE-USE BIOPSY FORCEPS: Brand: RADIAL JAW 4

## (undated) DEVICE — CANN SMPL SOFTECH BIFLO ETCO2 A/M 7FT